# Patient Record
Sex: FEMALE | Race: WHITE | NOT HISPANIC OR LATINO | Employment: FULL TIME | URBAN - METROPOLITAN AREA
[De-identification: names, ages, dates, MRNs, and addresses within clinical notes are randomized per-mention and may not be internally consistent; named-entity substitution may affect disease eponyms.]

---

## 2023-11-14 ENCOUNTER — APPOINTMENT (OUTPATIENT)
Dept: LAB | Facility: CLINIC | Age: 33
End: 2023-11-14
Payer: COMMERCIAL

## 2023-11-14 DIAGNOSIS — Z79.899 ENCOUNTER FOR LONG-TERM (CURRENT) USE OF OTHER MEDICATIONS: ICD-10-CM

## 2023-11-14 DIAGNOSIS — M05.79 RHEUMATOID ARTHRITIS INVOLVING MULTIPLE SITES WITH POSITIVE RHEUMATOID FACTOR (HCC): ICD-10-CM

## 2023-11-14 DIAGNOSIS — E87.6 HYPOKALEMIA: ICD-10-CM

## 2023-11-14 PROCEDURE — 86480 TB TEST CELL IMMUN MEASURE: CPT

## 2023-11-15 LAB
GAMMA INTERFERON BACKGROUND BLD IA-ACNC: <0 IU/ML
M TB IFN-G BLD-IMP: NEGATIVE
M TB IFN-G CD4+ BCKGRND COR BLD-ACNC: 0 IU/ML
M TB IFN-G CD4+ BCKGRND COR BLD-ACNC: 0 IU/ML
MITOGEN IGNF BCKGRD COR BLD-ACNC: 10 IU/ML

## 2023-12-18 ENCOUNTER — TELEPHONE (OUTPATIENT)
Dept: INFUSION CENTER | Facility: CLINIC | Age: 33
End: 2023-12-18

## 2023-12-18 NOTE — TELEPHONE ENCOUNTER
Spoke directly with patient and scheduled Remicade infusion for 1/16/2024 at 12 pm at [facility]. Pt provided with phone number and address to unit. Pt works for [employer] and [facility] is most convenient for her to get to her infusion appts.

## 2024-01-16 ENCOUNTER — HOSPITAL ENCOUNTER (OUTPATIENT)
Dept: INFUSION CENTER | Facility: CLINIC | Age: 34
Discharge: HOME | End: 2024-01-16
Payer: COMMERCIAL

## 2024-01-16 PROCEDURE — 96415 CHEMO IV INFUSION ADDL HR: CPT

## 2024-01-16 PROCEDURE — 96413 CHEMO IV INFUSION 1 HR: CPT

## 2024-01-16 RX ORDER — SODIUM CHLORIDE 9 MG/ML
20 INJECTION, SOLUTION INTRAVENOUS CONTINUOUS
Status: DISCONTINUED | OUTPATIENT
Start: 2024-01-16 | End: 2024-01-19 | Stop reason: HOSPADM

## 2024-01-16 RX ADMIN — INFLIXIMAB 500 MG: 100 INJECTION, POWDER, LYOPHILIZED, FOR SOLUTION INTRAVENOUS at 12:27

## 2024-01-16 RX ADMIN — SODIUM CHLORIDE 20 ML/HR: 0.9 INJECTION, SOLUTION INTRAVENOUS at 12:28

## 2024-01-16 NOTE — PROGRESS NOTES
Patient tolerated treatment without incident. Peripheral IV removed. Next appointment to be made pending provider clarification of order frequency, six vs eight weeks. AVS offered and declined.

## 2024-01-18 ENCOUNTER — TELEPHONE (OUTPATIENT)
Age: 34
End: 2024-01-18

## 2024-01-18 NOTE — TELEPHONE ENCOUNTER
----- Message from Juanita Llanos MD sent at 1/16/2024  3:07 PM EST -----  Regarding: FW: updated orders  ----- Message -----  From: Jessica Lui  Sent: 1/16/2024   3:04 PM EST  To: Jessica Lui; #  Subject: updated orders                                   Good Afternoon,    Patient was seen today at [facility] center. Per patient and doctor note from 11/14 patient should be scheduled every 8 weeks but on orders it states every 6 weeks. Can you please clarify and send new orders.    Thank you,  Judy Lui

## 2024-01-18 NOTE — TELEPHONE ENCOUNTER
Patient has been receiving Remicade infusions every 8 weeks.  I did speak with her today to confirm that she is doing well with her current dose and frequency.  She did note that she received a call that they received an updated order for every 8-week dosing and she is scheduled for her next infusion.

## 2024-02-05 DIAGNOSIS — M05.79 SEROPOSITIVE RHEUMATOID ARTHRITIS OF MULTIPLE SITES (HCC): Primary | ICD-10-CM

## 2024-02-05 RX ORDER — SODIUM CHLORIDE 9 MG/ML
20 INJECTION, SOLUTION INTRAVENOUS ONCE
OUTPATIENT
Start: 2024-03-12

## 2024-02-05 RX ORDER — SODIUM CHLORIDE 9 MG/ML
20 INJECTION, SOLUTION INTRAVENOUS ONCE
Status: CANCELLED | OUTPATIENT
Start: 2024-02-27

## 2024-03-12 ENCOUNTER — HOSPITAL ENCOUNTER (OUTPATIENT)
Dept: INFUSION CENTER | Facility: CLINIC | Age: 34
Discharge: HOME | End: 2024-03-12
Payer: COMMERCIAL

## 2024-03-12 VITALS
RESPIRATION RATE: 18 BRPM | TEMPERATURE: 98.1 F | SYSTOLIC BLOOD PRESSURE: 107 MMHG | HEART RATE: 76 BPM | DIASTOLIC BLOOD PRESSURE: 73 MMHG | OXYGEN SATURATION: 98 %

## 2024-03-12 DIAGNOSIS — M05.79 SEROPOSITIVE RHEUMATOID ARTHRITIS OF MULTIPLE SITES (HCC): Primary | ICD-10-CM

## 2024-03-12 PROCEDURE — 96415 CHEMO IV INFUSION ADDL HR: CPT

## 2024-03-12 PROCEDURE — 96413 CHEMO IV INFUSION 1 HR: CPT

## 2024-03-12 RX ORDER — SODIUM CHLORIDE 9 MG/ML
20 INJECTION, SOLUTION INTRAVENOUS ONCE
OUTPATIENT
Start: 2024-05-07

## 2024-03-12 RX ORDER — SODIUM CHLORIDE 9 MG/ML
20 INJECTION, SOLUTION INTRAVENOUS ONCE
Status: COMPLETED | OUTPATIENT
Start: 2024-03-12 | End: 2024-03-12

## 2024-03-12 RX ADMIN — SODIUM CHLORIDE 20 ML/HR: 0.9 INJECTION, SOLUTION INTRAVENOUS at 14:17

## 2024-03-12 RX ADMIN — INFLIXIMAB 500 MG: 100 INJECTION, POWDER, LYOPHILIZED, FOR SOLUTION INTRAVENOUS at 14:46

## 2024-03-12 NOTE — PROGRESS NOTES
Patient to infusion center for Remicade, offers no complaints. Denies any recent infection/abx use. She tolerated her infusion without incident. Next appt confirmed for 5/7 at 1200, declined AVS.

## 2024-03-15 ENCOUNTER — APPOINTMENT (OUTPATIENT)
Dept: LAB | Facility: CLINIC | Age: 34
End: 2024-03-15
Payer: COMMERCIAL

## 2024-03-19 ENCOUNTER — OFFICE VISIT (OUTPATIENT)
Age: 34
End: 2024-03-19
Payer: COMMERCIAL

## 2024-03-19 VITALS
TEMPERATURE: 98.7 F | WEIGHT: 148.6 LBS | SYSTOLIC BLOOD PRESSURE: 120 MMHG | DIASTOLIC BLOOD PRESSURE: 60 MMHG | OXYGEN SATURATION: 98 % | HEIGHT: 68 IN | BODY MASS INDEX: 22.52 KG/M2 | HEART RATE: 103 BPM

## 2024-03-19 DIAGNOSIS — Z79.899 ENCOUNTER FOR LONG-TERM (CURRENT) USE OF OTHER MEDICATIONS: ICD-10-CM

## 2024-03-19 DIAGNOSIS — E55.9 VITAMIN D INSUFFICIENCY: ICD-10-CM

## 2024-03-19 DIAGNOSIS — M05.79 SEROPOSITIVE RHEUMATOID ARTHRITIS OF MULTIPLE SITES (HCC): Primary | ICD-10-CM

## 2024-03-19 DIAGNOSIS — E06.3 AUTOIMMUNE THYROIDITIS: ICD-10-CM

## 2024-03-19 PROCEDURE — 99214 OFFICE O/P EST MOD 30 MIN: CPT | Performed by: PHYSICIAN ASSISTANT

## 2024-03-19 NOTE — ASSESSMENT & PLAN NOTE
History of positive thyroid antibodies.  Thyroid function testing has been normal.  We will continue to monitor labs.

## 2024-03-19 NOTE — PROGRESS NOTES
Assessment/Plan:    Seropositive rheumatoid arthritis of multiple sites (HCC)  Her rheumatoid arthritis is well-controlled with Remicade infusions every 8 weeks.  No signs of active inflammation or synovitis on exam today.  We did discuss decreasing her dose as she has been doing well on an every 8-week dosing cycle.  We will decrease her dose to 400 mg every 8 weeks and monitor her symptoms closely.  She is thinking about conception and we did discuss that she can start Plaquenil to help control her symptoms.  As she has been doing well she would like to hold off at this time and monitor her symptoms.  Continue regular labs as ordered.  She is up-to-date with her QuantiFERON gold testing.  Follow-up in 4 months or sooner if needed.    Autoimmune thyroiditis  History of positive thyroid antibodies.  Thyroid function testing has been normal.  We will continue to monitor labs.    Vitamin D insufficiency  History of vitamin D deficiency.  Continue over-the-counter vitamin D3.  We will check vitamin D with next labs.       Diagnoses and all orders for this visit:    Seropositive rheumatoid arthritis of multiple sites (HCC)  -     CBC and differential; Standing  -     Comprehensive metabolic panel; Standing  -     Sedimentation rate, automated; Standing  -     C-reactive protein; Standing  -     Urinalysis with microscopic; Standing    Vitamin D insufficiency  -     Vitamin D 25 hydroxy; Future    Encounter for long-term (current) use of other medications  -     CBC and differential; Standing  -     Comprehensive metabolic panel; Standing  -     Sedimentation rate, automated; Standing  -     C-reactive protein; Standing  -     Urinalysis with microscopic; Standing    Autoimmune thyroiditis    Other orders  -     inFLIXimab (REMICADE) 400 mg in sodium chloride 0.9 % 210 mL IVPB        Spent 30 minutes total reviewing labs, chart notes, imaging studies, performing history and physical exam, discussing medication and  treatment, counseling patient, and completing chart note.          Subjective:      Patient ID: Lori Camacho is a 34 y.o. female.    She is here for follow-up of her seropositive rheumatoid arthritis.  She has been on Remicade since 2009.  She is currently receiving 500 mg every 8 weeks.  Her RA symptoms are quiescent and well-controlled with Remicade.  She has minimal stiffness in the morning.  She has no swelling in her joints.    She has a history of positive thyroid antibodies however her thyroid function testing has been within normal limits.  She has a history of exercise-induced asthma as well.  This has been stable.    She had labs done on 3/15/2024.  CBC, CMP essentially unremarkable.  ESR, CRP within normal limits.  She is up-to-date with her QuantiFERON gold testing and had a negative test on 11/14/2023.        The following portions of the patient's history were reviewed and updated as appropriate: allergies, current medications, past family history, past medical history, past social history, past surgical history, and problem list.    Review of Systems   Constitutional:  Negative for chills, fatigue and fever.   HENT:  Negative for hearing loss, sore throat and tinnitus.    Eyes:  Negative for pain and visual disturbance.   Respiratory:  Negative for cough and shortness of breath.    Cardiovascular:  Negative for chest pain and palpitations.   Gastrointestinal:  Negative for abdominal pain, nausea and vomiting.   Genitourinary:  Negative for difficulty urinating.   Musculoskeletal:  Negative for arthralgias, back pain, gait problem, joint swelling, myalgias, neck pain and neck stiffness.   Skin:  Negative for rash.   Neurological:  Negative for dizziness, seizures, weakness, numbness and headaches.   Psychiatric/Behavioral:  Negative for confusion, decreased concentration and sleep disturbance.          Objective:      /60 (BP Location: Left arm, Patient Position: Sitting, Cuff Size:  "Adult)   Pulse 103   Temp 98.7 °F (37.1 °C) (Tympanic)   Ht 5' 8\" (1.727 m)   Wt 67.4 kg (148 lb 9.6 oz)   SpO2 98%   BMI 22.59 kg/m²          Physical Exam  Constitutional:       Appearance: Normal appearance.   Cardiovascular:      Rate and Rhythm: Normal rate and regular rhythm.   Pulmonary:      Breath sounds: Normal breath sounds.   Musculoskeletal:      Comments: Full range of motion neck, bilateral shoulders, elbows, wrists.  No synovitis noted in hands.  Full range of motion bilateral hips, knees, ankles.  No synovitis noted in feet.   Skin:     General: Skin is warm and dry.   Neurological:      General: No focal deficit present.      Mental Status: She is alert.         Dragon Dictation software was used to dictate this note. It may contain errors with dictating incorrect words/spelling. Please contact provider directly for any questions.    "

## 2024-03-19 NOTE — ASSESSMENT & PLAN NOTE
Her rheumatoid arthritis is well-controlled with Remicade infusions every 8 weeks.  No signs of active inflammation or synovitis on exam today.  We did discuss decreasing her dose as she has been doing well on an every 8-week dosing cycle.  We will decrease her dose to 400 mg every 8 weeks and monitor her symptoms closely.  She is thinking about conception and we did discuss that she can start Plaquenil to help control her symptoms.  As she has been doing well she would like to hold off at this time and monitor her symptoms.  Continue regular labs as ordered.  She is up-to-date with her QuantiFERON gold testing.  Follow-up in 4 months or sooner if needed.

## 2024-03-19 NOTE — ASSESSMENT & PLAN NOTE
History of vitamin D deficiency.  Continue over-the-counter vitamin D3.  We will check vitamin D with next labs.

## 2024-05-13 ENCOUNTER — APPOINTMENT (OUTPATIENT)
Dept: LAB | Facility: CLINIC | Age: 34
End: 2024-05-13
Payer: COMMERCIAL

## 2024-05-13 ENCOUNTER — NURSE TRIAGE (OUTPATIENT)
Age: 34
End: 2024-05-13

## 2024-05-13 DIAGNOSIS — O20.9 BLEEDING IN EARLY PREGNANCY: ICD-10-CM

## 2024-05-13 DIAGNOSIS — O20.9 BLEEDING IN EARLY PREGNANCY: Primary | ICD-10-CM

## 2024-05-13 LAB
ABO GROUP BLD: NORMAL
B-HCG SERPL-ACNC: 1061.2 MIU/ML (ref 0–5)
BLD GP AB SCN SERPL QL: NEGATIVE
RH BLD: POSITIVE
SPECIMEN EXPIRATION DATE: NORMAL

## 2024-05-13 PROCEDURE — 36415 COLL VENOUS BLD VENIPUNCTURE: CPT

## 2024-05-13 PROCEDURE — 86900 BLOOD TYPING SEROLOGIC ABO: CPT

## 2024-05-13 PROCEDURE — 86901 BLOOD TYPING SEROLOGIC RH(D): CPT

## 2024-05-13 PROCEDURE — 86850 RBC ANTIBODY SCREEN: CPT

## 2024-05-13 PROCEDURE — 84702 CHORIONIC GONADOTROPIN TEST: CPT

## 2024-05-13 NOTE — TELEPHONE ENCOUNTER
Placed call to the patient. She is still having bleeding- reviewed with her to go to the ER if she is soaking a pad in 1.5hr or less, or if she has severe abd pain. Advised patient no heavy lifting, pelvic rest, and ordered quants x2 with blood type and screen (unsure of blood type). Patient aware that we will be following up upon her lab results / schedule ultrasound if needed. Patient is agreeable to the plan of care.

## 2024-05-13 NOTE — TELEPHONE ENCOUNTER
Please call patient regarding bleeding. HCG quants can be ordered. Will need to do a 48 hour trend or bring her in for a viability US.

## 2024-05-13 NOTE — TELEPHONE ENCOUNTER
"Patient called states LMP 3/30. States she started experiencing vaginal bleeding/spotting on Saturday which started as light pink when wiping and progressed to bright red bleeding on Sunday. Patient denies heavy vaginal bleeding, passing any large clots, or severe abdominal pain. States she is bleeding less than 1 pad an hour and denies any current abdominal pain. D/V scheduled 5/24. Denies any recent intercourse, exercise, feeling lightheaded or dizzy. Advised will reach out to provider on call to make aware. Care advice reviewed. Patient aware will receive a call back with next steps. Patient verbalized understanding. No further questions at this time.     Reason for Disposition   MILD vaginal bleeding (i.e., less than 1 pad per  hour; less than patient's usual menstrual bleeding; not just spotting)    Answer Assessment - Initial Assessment Questions  1. ONSET: \"When did this bleeding start?\"        Saturday evening - light pink when wiping. Mid afternoon Sunday bright red and heavier   2. DESCRIPTION: \"Describe the bleeding that you are having.\" \"How much bleeding is there?\"     - SPOTTING: spotting, or pinkish / brownish mucous discharge; does not fill panti-liner or pad     - MILD:  less than 1 pad / hour; less than patient's usual menstrual bleeding    - MODERATE: 1-2 pads / hour; 1 menstrual cup every 6 hours; small-medium blood clots (e.g., pea, grape, small coin)    - SEVERE: soaking 2 or more pads/hour for 2 or more hours; 1 menstrual cup every 2 hours; bleeding not contained by pads or continuous red blood from vagina; large blood clots (e.g., golf ball, large coin)       Less than a pad an hour - bright red. Difficult to assess size of clot this morning  3. ABDOMINAL PAIN SEVERITY: If present, ask: \"How bad is it?\"  (e.g., Scale 1-10; mild, moderate, or severe)    - MILD (1-3): doesn't interfere with normal activities, abdomen soft and not tender to touch     - MODERATE (4-7): interferes with normal " "activities or awakens from sleep, tender to touch     - SEVERE (8-10): excruciating pain, doubled over, unable to do any normal activities      Yesterday 2-3/10. Denies any pain today  4. PREGNANCY: \"Do you know how many weeks or months pregnant you are?\" \"When was the first day of your last normal menstrual period?\"      Lmp 3/30  5. HEMODYNAMIC STATUS: \"Are you weak or feeling lightheaded?\" If Yes, ask: \"Can you stand and walk normally?\"       denies  6. OTHER SYMPTOMS: \"What other symptoms are you having with the bleeding?\" (e.g., passed tissue, vaginal discharge, fever, menstrual-type cramps)      Clot this morning    Protocols used: Pregnancy - Vaginal Bleeding Less Than 20 Weeks EGA-ADULT-OH    "

## 2024-05-15 ENCOUNTER — APPOINTMENT (OUTPATIENT)
Dept: LAB | Facility: CLINIC | Age: 34
End: 2024-05-15
Payer: COMMERCIAL

## 2024-05-15 DIAGNOSIS — O20.9 BLEEDING IN EARLY PREGNANCY: ICD-10-CM

## 2024-05-15 LAB — B-HCG SERPL-ACNC: 995.3 MIU/ML (ref 0–5)

## 2024-05-15 PROCEDURE — 36415 COLL VENOUS BLD VENIPUNCTURE: CPT

## 2024-05-15 PROCEDURE — 84702 CHORIONIC GONADOTROPIN TEST: CPT

## 2024-05-16 DIAGNOSIS — O03.9 SPONTANEOUS ABORTION: Primary | ICD-10-CM

## 2024-05-24 ENCOUNTER — TELEPHONE (OUTPATIENT)
Dept: OBGYN CLINIC | Facility: CLINIC | Age: 34
End: 2024-05-24

## 2024-05-24 ENCOUNTER — APPOINTMENT (OUTPATIENT)
Dept: LAB | Facility: CLINIC | Age: 34
End: 2024-05-24
Payer: COMMERCIAL

## 2024-05-24 DIAGNOSIS — Z00.8 OTHER SPECIFIED GENERAL MEDICAL EXAMINATION: ICD-10-CM

## 2024-05-24 DIAGNOSIS — O03.9 SPONTANEOUS ABORTION: ICD-10-CM

## 2024-05-24 LAB
B-HCG SERPL-ACNC: 22.6 MIU/ML (ref 0–5)
CHOLEST SERPL-MCNC: 149 MG/DL
EST. AVERAGE GLUCOSE BLD GHB EST-MCNC: 105 MG/DL
HBA1C MFR BLD: 5.3 %
HDLC SERPL-MCNC: 65 MG/DL
LDLC SERPL CALC-MCNC: 77 MG/DL (ref 0–100)
NONHDLC SERPL-MCNC: 84 MG/DL
TRIGL SERPL-MCNC: 36 MG/DL

## 2024-05-24 PROCEDURE — 36415 COLL VENOUS BLD VENIPUNCTURE: CPT

## 2024-05-24 PROCEDURE — 84702 CHORIONIC GONADOTROPIN TEST: CPT

## 2024-05-24 PROCEDURE — 80061 LIPID PANEL: CPT

## 2024-05-24 PROCEDURE — 83036 HEMOGLOBIN GLYCOSYLATED A1C: CPT

## 2024-05-31 ENCOUNTER — APPOINTMENT (OUTPATIENT)
Dept: LAB | Facility: CLINIC | Age: 34
End: 2024-05-31
Payer: COMMERCIAL

## 2024-05-31 DIAGNOSIS — O03.9 SPONTANEOUS ABORTION: ICD-10-CM

## 2024-05-31 DIAGNOSIS — E55.9 VITAMIN D INSUFFICIENCY: ICD-10-CM

## 2024-05-31 LAB — B-HCG SERPL-ACNC: 4 MIU/ML (ref 0–5)

## 2024-05-31 PROCEDURE — 84702 CHORIONIC GONADOTROPIN TEST: CPT

## 2024-05-31 PROCEDURE — 36415 COLL VENOUS BLD VENIPUNCTURE: CPT

## 2024-06-03 ENCOUNTER — OFFICE VISIT (OUTPATIENT)
Dept: URGENT CARE | Facility: CLINIC | Age: 34
End: 2024-06-03
Payer: COMMERCIAL

## 2024-06-03 VITALS
RESPIRATION RATE: 18 BRPM | HEIGHT: 67 IN | SYSTOLIC BLOOD PRESSURE: 112 MMHG | DIASTOLIC BLOOD PRESSURE: 70 MMHG | OXYGEN SATURATION: 99 % | BODY MASS INDEX: 21.97 KG/M2 | WEIGHT: 140 LBS | TEMPERATURE: 98.1 F | HEART RATE: 84 BPM

## 2024-06-03 DIAGNOSIS — R42 DIZZINESS AND GIDDINESS: Primary | ICD-10-CM

## 2024-06-03 DIAGNOSIS — H61.23 BILATERAL IMPACTED CERUMEN: ICD-10-CM

## 2024-06-03 PROCEDURE — 99213 OFFICE O/P EST LOW 20 MIN: CPT

## 2024-06-03 RX ORDER — MECLIZINE HYDROCHLORIDE 25 MG/1
25 TABLET ORAL 3 TIMES DAILY PRN
Qty: 30 TABLET | Refills: 0 | Status: SHIPPED | OUTPATIENT
Start: 2024-06-03

## 2024-06-03 NOTE — PROGRESS NOTES
[facility]        NAME: Lori Camacho is a 34 y.o. female  : 1990    MRN: [id number]  DATE: Mara 3, 2024  TIME: 11:28 AM    Assessment and Plan   Dizziness and giddiness [R42]  1. Dizziness and giddiness  meclizine (ANTIVERT) 25 mg tablet      2. Bilateral impacted cerumen              Patient Instructions     Start meclizine and take as needed as directed.    Drink plenty of fluids.    Follow-up with your PCP in 3-5 days.    Go to the ED for any persistent or worsening symptoms.    Use over-the-counter Debrox drops in the following manner: 5 drops in each ear daily for 5 consecutive days every month.     If tests are performed, our office will contact you with results only if changes need to made to the care plan discussed with you at the visit. You can review your full results on [facility].      Chief Complaint     Chief Complaint   Patient presents with    Dizziness     Pt reports on Saturday night she woke up and vomited - reports dizziness at that time. Reports yesterday vomiting with sitting and standing. Today reports continued dizziness with head movement.          History of Present Illness       34-year-old female who presents with dizziness x 1 day. Patient states she woke up from sleep early  morning and felt nauseous. Stood up out of bed quickly, felt dizzy, and vomited x 1. States she had a few more episodes of vomiting throughout the day yesterday with position changes. Felt best while lying on her left side. Has not vomited yet today. Yesterday her coworker in PT came over and performed the Epley maneuver twice which has helped her symptoms improve. Does still have some lingering dizziness and lightheadedness today. No known fevers/chills. Denies headache, weakness, blurred vision, chest pain, shortness of breath, abdominal pain, and back/neck pain. Denies history of migraines. Had similar experience with ruptured ear drum 2023.        Review of Systems    Review of Systems   Constitutional:  Negative for chills and fever.   HENT:  Negative for congestion, ear pain, sinus pressure and sore throat.    Eyes:  Negative for photophobia, pain and visual disturbance.   Respiratory:  Negative for chest tightness and shortness of breath.    Cardiovascular:  Negative for chest pain and palpitations.   Gastrointestinal:  Positive for nausea and vomiting (resolved). Negative for abdominal pain and diarrhea.   Musculoskeletal:  Negative for back pain and neck pain.   Skin:  Negative for pallor and rash.   Neurological:  Positive for dizziness and light-headedness. Negative for syncope, weakness, numbness and headaches.         Current Medications       Current Outpatient Medications:     CALCIUM PO, Take 2,000 mg by mouth daily, Disp: , Rfl:     Cholecalciferol (Vitamin D) 125 MCG (5000 UT) CAPS, Take by mouth daily , Disp: , Rfl:     meclizine (ANTIVERT) 25 mg tablet, Take 1 tablet (25 mg total) by mouth 3 (three) times a day as needed for dizziness, Disp: 30 tablet, Rfl: 0    inFLIXimab (REMICADE) 100 mg, Infuse into a venous catheter (Patient not taking: Reported on 6/3/2024), Disp: , Rfl:     medroxyPROGESTERone (DEPO-PROVERA) 150 mg/mL injection, Inject 1 mL (150 mg total) into a muscle every 3 (three) months (Patient not taking: Reported on 1/16/2024), Disp: 1 mL, Rfl: 3    medroxyPROGESTERone acetate (DEPO-PROVERA SYRINGE) 150 mg/mL injection, , Disp: , Rfl:     Current Facility-Administered Medications:     medroxyPROGESTERone acetate (DEPO-PROVERA SYRINGE) IM injection 150 mg, 150 mg, Intramuscular, Q3 Months, Lena Vital PA-C, 150 mg at 03/21/23 1250    ondansetron (ZOFRAN-ODT) dispersible tablet 4 mg, 4 mg, Oral, Q6H PRN, CHARY Abreu, 4 mg at 01/06/23 1254    Current Allergies     Allergies as of 06/03/2024 - Reviewed 06/03/2024   Allergen Reaction Noted    Amoxicillin Other (See Comments) 04/25/2017    Other Other (See Comments) 07/05/2018         "    The following portions of the patient's history were reviewed and updated as appropriate: allergies, current medications, past family history, past medical history, past social history, past surgical history and problem list.     Past Medical History:   Diagnosis Date    Abnormal Pap smear of cervix     Asthma     exercise induced    Exercise-induced asthma     Hashimoto's thyroiditis     RA (rheumatoid arthritis) (HCC)     Seasonal allergies        Past Surgical History:   Procedure Laterality Date    CERVICAL BIOPSY  W/ LOOP ELECTRODE EXCISION  Cryo surgery    DENTAL SURGERY         Family History   Problem Relation Age of Onset    No Known Problems Mother     No Known Problems Father     No Known Problems Maternal Grandmother     Hypothyroidism Family     Cancer Family     Hypertension Family          Medications have been verified.        Objective   /70   Pulse 84   Temp 98.1 °F (36.7 °C)   Resp 18   Ht 5' 7\" (1.702 m)   Wt 63.5 kg (140 lb)   SpO2 99%   BMI 21.93 kg/m²        Physical Exam     Physical Exam  Vitals and nursing note reviewed.   Constitutional:       General: She is not in acute distress.     Appearance: She is not ill-appearing, toxic-appearing or diaphoretic.   HENT:      Head: Normocephalic and atraumatic.      Right Ear: There is impacted cerumen.      Left Ear: There is impacted cerumen.      Nose: Nose normal.      Mouth/Throat:      Mouth: Mucous membranes are moist.      Pharynx: Oropharynx is clear.   Eyes:      Extraocular Movements: Extraocular movements intact.      Conjunctiva/sclera: Conjunctivae normal.      Pupils: Pupils are equal, round, and reactive to light.   Cardiovascular:      Rate and Rhythm: Normal rate and regular rhythm.      Pulses: Normal pulses.      Heart sounds: Normal heart sounds.   Pulmonary:      Effort: Pulmonary effort is normal.      Breath sounds: Normal breath sounds.   Musculoskeletal:         General: Normal range of motion.      " Cervical back: Normal range of motion and neck supple.   Skin:     General: Skin is warm and dry.      Capillary Refill: Capillary refill takes less than 2 seconds.   Neurological:      Mental Status: She is alert and oriented to person, place, and time.      GCS: GCS eye subscore is 4. GCS verbal subscore is 5. GCS motor subscore is 6.      Sensory: Sensation is intact.      Motor: Motor function is intact. No weakness.      Coordination: Coordination is intact. Romberg sign negative. Finger-Nose-Finger Test normal.      Gait: Gait normal.

## 2024-06-03 NOTE — PATIENT INSTRUCTIONS
Start meclizine and take as needed as directed.    Drink plenty of fluids.    Follow-up with your PCP in 3-5 days.    Go to the ED for any persistent or worsening symptoms.    Use over-the-counter Debrox drops in the following manner: 5 drops in each ear daily for 5 consecutive days every month.

## 2024-06-03 NOTE — LETTER
Mara 3, 2024     Patient: Lori Camacho   YOB: 1990   Date of Visit: 6/3/2024       To Whom it May Concern:    Lori Camacho was seen in my clinic on 6/3/2024. She may return to work on 6/5/2024 .    If you have any questions or concerns, please don't hesitate to call.         Sincerely,          CHARY Honeycutt        CC: No Recipients

## 2024-06-10 ENCOUNTER — TELEPHONE (OUTPATIENT)
Age: 34
End: 2024-06-10

## 2024-06-10 NOTE — TELEPHONE ENCOUNTER
Pt calls in and states that the last time she spoke with Lori she informed her that she was pregnant and would be stopping Remicade infusions. Unfortunately she had a miscarriage  and wanted Lori's advise on continuing Remicade

## 2024-06-11 NOTE — TELEPHONE ENCOUNTER
I spoke with Lori today.  Her OB/GYN has recommended waiting until she has a full menstrual cycle before trying to conceive again.  As this has not happened yet I did suggest that she could consider getting another Remicade infusion now as her last infusion was in March.  We will continue to monitor closely and when she gets pregnant we will plan to discontinue infusions.

## 2024-06-20 DIAGNOSIS — M05.79 SEROPOSITIVE RHEUMATOID ARTHRITIS OF MULTIPLE SITES (HCC): Primary | ICD-10-CM

## 2024-06-20 RX ORDER — SODIUM CHLORIDE 9 MG/ML
20 INJECTION, SOLUTION INTRAVENOUS ONCE
Status: CANCELLED | OUTPATIENT
Start: 2024-06-25

## 2024-06-25 ENCOUNTER — HOSPITAL ENCOUNTER (OUTPATIENT)
Dept: INFUSION CENTER | Facility: CLINIC | Age: 34
Discharge: HOME | End: 2024-06-25
Payer: COMMERCIAL

## 2024-06-25 VITALS
RESPIRATION RATE: 16 BRPM | TEMPERATURE: 98.4 F | DIASTOLIC BLOOD PRESSURE: 66 MMHG | HEART RATE: 91 BPM | OXYGEN SATURATION: 99 % | SYSTOLIC BLOOD PRESSURE: 100 MMHG

## 2024-06-25 DIAGNOSIS — M05.79 SEROPOSITIVE RHEUMATOID ARTHRITIS OF MULTIPLE SITES (HCC): Primary | ICD-10-CM

## 2024-06-25 RX ORDER — SODIUM CHLORIDE 9 MG/ML
20 INJECTION, SOLUTION INTRAVENOUS ONCE
Status: COMPLETED | OUTPATIENT
Start: 2024-06-25 | End: 2024-06-25

## 2024-06-25 RX ORDER — SODIUM CHLORIDE 9 MG/ML
20 INJECTION, SOLUTION INTRAVENOUS ONCE
OUTPATIENT
Start: 2024-08-20

## 2024-06-25 RX ADMIN — INFLIXIMAB 400 MG: 100 INJECTION, POWDER, LYOPHILIZED, FOR SOLUTION INTRAVENOUS at 13:58

## 2024-06-25 RX ADMIN — SODIUM CHLORIDE 20 ML/HR: 0.9 INJECTION, SOLUTION INTRAVENOUS at 13:32

## 2024-06-25 NOTE — PROGRESS NOTES
Pt tolerated treatment without incident. COnfirmed next apt for 8/20/24 @ 12pm @ [facility]. Mally ADLERS.

## 2024-07-29 ENCOUNTER — TELEPHONE (OUTPATIENT)
Age: 34
End: 2024-07-29

## 2024-07-29 NOTE — TELEPHONE ENCOUNTER
Left message for patient to return office call in regards to scheduling an appointment or verifying if she is still a patient at our office     Thank you

## 2024-08-20 ENCOUNTER — HOSPITAL ENCOUNTER (OUTPATIENT)
Dept: INFUSION CENTER | Facility: CLINIC | Age: 34
Discharge: HOME | End: 2024-08-20
Payer: COMMERCIAL

## 2024-08-20 VITALS
TEMPERATURE: 99.8 F | HEART RATE: 87 BPM | RESPIRATION RATE: 18 BRPM | SYSTOLIC BLOOD PRESSURE: 100 MMHG | OXYGEN SATURATION: 99 % | DIASTOLIC BLOOD PRESSURE: 67 MMHG

## 2024-08-20 DIAGNOSIS — M05.79 SEROPOSITIVE RHEUMATOID ARTHRITIS OF MULTIPLE SITES (HCC): Primary | ICD-10-CM

## 2024-08-20 PROCEDURE — 96415 CHEMO IV INFUSION ADDL HR: CPT

## 2024-08-20 PROCEDURE — 96413 CHEMO IV INFUSION 1 HR: CPT

## 2024-08-20 RX ORDER — SODIUM CHLORIDE 9 MG/ML
20 INJECTION, SOLUTION INTRAVENOUS ONCE
Status: COMPLETED | OUTPATIENT
Start: 2024-08-20 | End: 2024-08-20

## 2024-08-20 RX ORDER — SODIUM CHLORIDE 9 MG/ML
20 INJECTION, SOLUTION INTRAVENOUS ONCE
OUTPATIENT
Start: 2024-10-15

## 2024-08-20 RX ADMIN — SODIUM CHLORIDE 20 ML/HR: 0.9 INJECTION, SOLUTION INTRAVENOUS at 12:48

## 2024-08-20 RX ADMIN — INFLIXIMAB 400 MG: 100 INJECTION, POWDER, LYOPHILIZED, FOR SOLUTION INTRAVENOUS at 12:48

## 2024-08-20 NOTE — PROGRESS NOTES
Patient presents today for treatment with remicade. Patient offers no complaints. VSS. Patient denies recent illness/infection and or antibiotic use. Peripheral IV inserted without incident.

## 2024-08-20 NOTE — PROGRESS NOTES
Patient tolerated treatment without incident. Peripheral IV removed. Next appointment confirmed for 10/15/2024 at 1400. AVS offered and declined.

## 2024-08-27 ENCOUNTER — ANNUAL EXAM (OUTPATIENT)
Dept: OBGYN CLINIC | Facility: CLINIC | Age: 34
End: 2024-08-27
Payer: COMMERCIAL

## 2024-08-27 VITALS
WEIGHT: 141 LBS | SYSTOLIC BLOOD PRESSURE: 116 MMHG | HEIGHT: 67 IN | DIASTOLIC BLOOD PRESSURE: 64 MMHG | BODY MASS INDEX: 22.13 KG/M2

## 2024-08-27 DIAGNOSIS — Z12.4 ENCOUNTER FOR SCREENING FOR MALIGNANT NEOPLASM OF CERVIX: ICD-10-CM

## 2024-08-27 DIAGNOSIS — R87.810 ASCUS WITH POSITIVE HIGH RISK HPV CERVICAL: ICD-10-CM

## 2024-08-27 DIAGNOSIS — R87.610 ASCUS WITH POSITIVE HIGH RISK HPV CERVICAL: ICD-10-CM

## 2024-08-27 DIAGNOSIS — Z31.9 DESIRE FOR PREGNANCY: ICD-10-CM

## 2024-08-27 DIAGNOSIS — Z01.419 WELL WOMAN EXAM WITH ROUTINE GYNECOLOGICAL EXAM: Primary | ICD-10-CM

## 2024-08-27 DIAGNOSIS — Z11.51 SCREENING FOR HPV (HUMAN PAPILLOMAVIRUS): ICD-10-CM

## 2024-08-27 PROCEDURE — G0145 SCR C/V CYTO,THINLAYER,RESCR: HCPCS | Performed by: PATHOLOGY

## 2024-08-27 PROCEDURE — G0476 HPV COMBO ASSAY CA SCREEN: HCPCS | Performed by: PHYSICIAN ASSISTANT

## 2024-08-27 PROCEDURE — G0124 SCREEN C/V THIN LAYER BY MD: HCPCS | Performed by: PATHOLOGY

## 2024-08-27 PROCEDURE — S0612 ANNUAL GYNECOLOGICAL EXAMINA: HCPCS | Performed by: PHYSICIAN ASSISTANT

## 2024-08-27 NOTE — PROGRESS NOTES
ASSESSMENT & PLAN:   Diagnoses and all orders for this visit:    Well woman exam with routine gynecological exam  -     Liquid-based pap, screening    Screening for HPV (human papillomavirus)  -     Liquid-based pap, screening    Encounter for screening for malignant neoplasm of cervix  -     Liquid-based pap, screening    ASCUS with positive high risk HPV cervical  -     Liquid-based pap, screening    Desire for pregnancy  -     Ambulatory Referral to Infertility; Future      Actively trying to conceive. Had MAB in 2024. Open to seeing SHAYLEE    The following were reviewed in today's visit: ASCCP guidelines, Gardisil vaccination, STD testing breast self exam, STD testing, exercise, and healthy diet.    Patient to return to office in yearly for annual exam.     All questions have been answered to her satisfaction.        CC:  Annual Gynecologic Examination  Chief Complaint   Patient presents with    Gynecologic Exam     Lori Camacho is here for her annual appt; pap ordered per note.  (hx cryo, ANDREA 3, PAP , NILM, HPV neg. yearly paps  Last pap 2022 ASCUS/ Pos HPV other )         HPI: Lori Camacho is a 34 y.o.  who presents for annual gynecologic examination.  She has the following concerns:    Desire for conception      Health Maintenance:    Exercise: frequently  Breast exams/breast awareness: yes    Past Medical History:   Diagnosis Date    Abnormal Pap smear of cervix     Asthma     exercise induced    Exercise-induced asthma     Hashimoto's thyroiditis     RA (rheumatoid arthritis) (HCC)     Seasonal allergies        Past Surgical History:   Procedure Laterality Date    CERVICAL BIOPSY  W/ LOOP ELECTRODE EXCISION  Cryo surgery    DENTAL SURGERY         Past OB/Gyn History:  Period Cycle (Days): 27  Period Duration (Days): 7  Period Pattern: Regular  Menstrual Flow: Moderate  Menstrual Control: Panty liner, Thin pad  Dysmenorrhea: (!) Mild  Dysmenorrhea Symptoms: CrampingPatient's  last menstrual period was 08/05/2024 (exact date).    Last Pap: 2023 : ASCUS with NEGATIVE high risk HPV  History of abnormal Pap smear: yes  HPV vaccine completed: yes    Patient is currently sexually active.   STD testing: no  Current contraception: none      Family History  Family History   Problem Relation Age of Onset    No Known Problems Mother     No Known Problems Father     No Known Problems Maternal Grandmother     Hypothyroidism Family     Cancer Family     Hypertension Family        Family history of uterine or ovarian cancer: no  Family history of breast cancer: no  Family history of colon cancer: no    Social History:  Social History     Socioeconomic History    Marital status: /Civil Union     Spouse name: Not on file    Number of children: Not on file    Years of education: graduated from [employer]    Highest education level: Not on file   Occupational History    Occupation: [employer]   Tobacco Use    Smoking status: Never    Smokeless tobacco: Never   Vaping Use    Vaping status: Never Used   Substance and Sexual Activity    Alcohol use: Yes     Alcohol/week: 1.0 standard drink of alcohol     Types: 1 Glasses of wine per week     Comment: occasional    Drug use: No    Sexual activity: Yes     Partners: Male     Birth control/protection: None   Other Topics Concern    Not on file   Social History Narrative    Daily coffee consumption    Exercises regularly     Social Determinants of Health     Financial Resource Strain: Not on file   Food Insecurity: Not on file   Transportation Needs: Not on file   Physical Activity: Not on file   Stress: Not on file   Social Connections: Unknown (6/18/2024)    Received from [facility]     How often do you feel lonely or isolated from those around you? (Adult - for ages 18 years and over): Not on file   Intimate Partner Violence: Not on file   Housing Stability: Not on file     Domestic violence screen:  "negative    Allergies:  Allergies   Allergen Reactions    Amoxicillin Other (See Comments)     Childhood allergy     Other Other (See Comments)     Seasonal- Runny nose/ congestion        Medications:    Current Outpatient Medications:     CALCIUM PO, Take 2,000 mg by mouth daily, Disp: , Rfl:     Cholecalciferol (Vitamin D) 125 MCG (5000 UT) CAPS, Take by mouth daily , Disp: , Rfl:     inFLIXimab (REMICADE) 100 mg, Inject into a catheter in a vein, Disp: , Rfl:     Current Facility-Administered Medications:     medroxyPROGESTERone acetate (DEPO-PROVERA SYRINGE) IM injection 150 mg, 150 mg, Intramuscular, Q3 Months, Lena Vital PA-C, 150 mg at 03/21/23 1250    ondansetron (ZOFRAN-ODT) dispersible tablet 4 mg, 4 mg, Oral, Q6H PRN, CHARY Abreu, 4 mg at 01/06/23 1254    Review of Systems:  Review of Systems   Constitutional:  Negative for chills, fever and unexpected weight change.   Respiratory:  Negative for shortness of breath.    Cardiovascular:  Negative for chest pain.   Gastrointestinal:  Negative for abdominal pain, diarrhea, nausea and vomiting.   Skin:  Negative for rash.   Psychiatric/Behavioral:  Negative for dysphoric mood. The patient is not nervous/anxious.          Physical Exam:  /64 (BP Location: Right arm, Patient Position: Sitting, Cuff Size: Standard)   Ht 5' 7\" (1.702 m)   Wt 64 kg (141 lb)   LMP 08/05/2024 (Exact Date)   BMI 22.08 kg/m²    Physical Exam  Constitutional:       Appearance: Normal appearance.   Genitourinary:      Vulva and urethral meatus normal.      No lesions in the vagina.      Right Labia: No rash or lesions.     Left Labia: No lesions or rash.     No vaginal discharge, erythema or bleeding.        Right Adnexa: not tender and no mass present.     Left Adnexa: not tender and no mass present.     No cervical discharge or lesion.      Uterus is not tender.   Breasts:     Breasts are symmetrical.      Right: No mass or skin change.      Left: No mass or " skin change.   HENT:      Head: Normocephalic and atraumatic.   Cardiovascular:      Rate and Rhythm: Normal rate and regular rhythm.      Heart sounds: Normal heart sounds. No murmur heard.     No friction rub. No gallop.   Pulmonary:      Effort: Pulmonary effort is normal.      Breath sounds: Normal breath sounds. No wheezing, rhonchi or rales.   Abdominal:      General: Abdomen is flat. There is no distension.      Palpations: Abdomen is soft.      Tenderness: There is no abdominal tenderness.   Musculoskeletal:      Cervical back: Neck supple.   Lymphadenopathy:      Upper Body:      Right upper body: No axillary adenopathy.      Left upper body: No axillary adenopathy.   Neurological:      General: No focal deficit present.      Mental Status: She is alert.   Skin:     General: Skin is warm and dry.   Psychiatric:         Mood and Affect: Mood normal.         Behavior: Behavior normal.   Vitals reviewed.

## 2024-08-28 LAB
HPV HR 12 DNA CVX QL NAA+PROBE: NEGATIVE
HPV16 DNA CVX QL NAA+PROBE: NEGATIVE
HPV18 DNA CVX QL NAA+PROBE: NEGATIVE

## 2024-09-04 LAB
LAB AP GYN PRIMARY INTERPRETATION: ABNORMAL
Lab: ABNORMAL
PATH INTERP SPEC-IMP: ABNORMAL

## 2024-09-30 ENCOUNTER — APPOINTMENT (OUTPATIENT)
Dept: LAB | Facility: CLINIC | Age: 34
End: 2024-09-30
Payer: COMMERCIAL

## 2024-09-30 DIAGNOSIS — Z32.01 POSITIVE URINE PREGNANCY TEST: ICD-10-CM

## 2024-09-30 DIAGNOSIS — Z32.01 POSITIVE URINE PREGNANCY TEST: Primary | ICD-10-CM

## 2024-09-30 LAB — B-HCG SERPL-ACNC: ABNORMAL MIU/ML (ref 0–5)

## 2024-09-30 PROCEDURE — 36415 COLL VENOUS BLD VENIPUNCTURE: CPT

## 2024-09-30 PROCEDURE — 84702 CHORIONIC GONADOTROPIN TEST: CPT

## 2024-10-02 ENCOUNTER — APPOINTMENT (OUTPATIENT)
Dept: LAB | Facility: CLINIC | Age: 34
End: 2024-10-02
Payer: COMMERCIAL

## 2024-10-02 DIAGNOSIS — Z32.01 POSITIVE URINE PREGNANCY TEST: ICD-10-CM

## 2024-10-02 LAB — B-HCG SERPL-ACNC: ABNORMAL MIU/ML (ref 0–5)

## 2024-10-02 PROCEDURE — 84702 CHORIONIC GONADOTROPIN TEST: CPT

## 2024-10-02 PROCEDURE — 36415 COLL VENOUS BLD VENIPUNCTURE: CPT

## 2024-10-03 ENCOUNTER — TELEPHONE (OUTPATIENT)
Age: 34
End: 2024-10-03

## 2024-10-03 ENCOUNTER — ULTRASOUND (OUTPATIENT)
Dept: OBGYN CLINIC | Facility: CLINIC | Age: 34
End: 2024-10-03
Payer: COMMERCIAL

## 2024-10-03 VITALS
SYSTOLIC BLOOD PRESSURE: 120 MMHG | WEIGHT: 136 LBS | HEIGHT: 67 IN | DIASTOLIC BLOOD PRESSURE: 82 MMHG | BODY MASS INDEX: 21.35 KG/M2

## 2024-10-03 DIAGNOSIS — N91.2 AMENORRHEA: Primary | ICD-10-CM

## 2024-10-03 PROCEDURE — 99214 OFFICE O/P EST MOD 30 MIN: CPT | Performed by: PHYSICIAN ASSISTANT

## 2024-10-03 PROCEDURE — 76817 TRANSVAGINAL US OBSTETRIC: CPT | Performed by: PHYSICIAN ASSISTANT

## 2024-10-03 NOTE — TELEPHONE ENCOUNTER
Please remove PCP from pt chart     Pt transferred to another facility due to distance     Please see pt message from 9/12/2024    Thank you

## 2024-10-03 NOTE — PROGRESS NOTES
"Assessment/Plan:  - TVUS reveals IUP consistent with LMP dating.   - Reviewed bleeding precautions  - Call for concerns  - RTO 3 weeks for viability scan  Encounter Diagnosis     ICD-10-CM    1. Amenorrhea  N91.2               Subjective:       Patient ID: Lori Camacho 1990        Lori Camacho is a 34 y.o.  presenting to the office for pregnancy confirmation. Patient's last menstrual period was 2024 (exact date). , placing her at 4w6d today with MIN of 6-6-25. She is feeling well today. Patient had HCG done which was ~10,000 and then ~20,000 48 hours later.         OB History    Para Term  AB Living   2 0 0 0 1 0   SAB IAB Ectopic Multiple Live Births   1 0 0 0 0      # Outcome Date GA Lbr Michael/2nd Weight Sex Type Anes PTL Lv   2 Current            1 SAB 24 6w0d    SAB   FD      Obstetric Comments   Menarche 12         The following portions of the patient's history were reviewed and updated as appropriate: allergies, current medications, past family history, past medical history, past social history, past surgical history, and problem list.    Allergies:  Amoxicillin and Other    Medications:    Current Outpatient Medications:     CALCIUM PO, Take 2,000 mg by mouth daily, Disp: , Rfl:     Cholecalciferol (Vitamin D) 125 MCG (5000 UT) CAPS, Take by mouth daily , Disp: , Rfl:     inFLIXimab (REMICADE) 100 mg, Inject into a catheter in a vein, Disp: , Rfl:     Current Facility-Administered Medications:     ondansetron (ZOFRAN-ODT) dispersible tablet 4 mg, 4 mg, Oral, Q6H PRN, CHARY Abreu, 4 mg at 23 1254      Review of Systems:   Review of Systems       Objective:       Visit Vitals  /82 (BP Location: Left arm, Patient Position: Sitting, Cuff Size: Standard)   Ht 5' 7\" (1.702 m)   Wt 61.7 kg (136 lb)   LMP 2024 (Exact Date)   BMI 21.30 kg/m²   OB Status Pregnant   Smoking Status Never   BSA 1.72 m²        GEN: The patient was alert and " oriented x3, pleasant well-appearing female in no acute distress.   PULM: nonlabored respirations  MSK: Normal gait  : WNl  Skin: warm, dry  Neuro: no focal deficits  Psych: normal affect and judgement, cooperative    Ultrasound:     Viability US     Gestational sac: present               Location: intrauterine    Size: 1.37cm (5w4d)  Yolk sac: present  Fetal pole: not visualized     Ovaries: normal appearing bilaterally  Cul de sac: absence of free fluid  Uterus: normal in appearance           Ultrasound Probe Disinfection    A transvaginal ultrasound was performed.   Prior to use, disinfection was performed with High Level Disinfection Process (Trophon)  Probe serial number RVRSDE: [id number] was used    Lynsey Chandra PA-C  10/03/24  1:21 PM

## 2024-10-04 NOTE — TELEPHONE ENCOUNTER
10/04/24 8:01 AM        The office's request has been received, reviewed, and the patient chart updated. The PCP has successfully been removed with a patient attribution note. This message will now be completed.        Thank you  Maxine Gregory

## 2024-10-15 ENCOUNTER — HOSPITAL ENCOUNTER (OUTPATIENT)
Dept: INFUSION CENTER | Facility: CLINIC | Age: 34
Discharge: HOME | End: 2024-10-15

## 2024-10-29 ENCOUNTER — ULTRASOUND (OUTPATIENT)
Dept: OBGYN CLINIC | Facility: CLINIC | Age: 34
End: 2024-10-29
Payer: COMMERCIAL

## 2024-10-29 VITALS
DIASTOLIC BLOOD PRESSURE: 70 MMHG | WEIGHT: 133 LBS | BODY MASS INDEX: 20.88 KG/M2 | SYSTOLIC BLOOD PRESSURE: 124 MMHG | HEIGHT: 67 IN

## 2024-10-29 DIAGNOSIS — N91.2 AMENORRHEA: Primary | ICD-10-CM

## 2024-10-29 PROCEDURE — 99214 OFFICE O/P EST MOD 30 MIN: CPT | Performed by: PHYSICIAN ASSISTANT

## 2024-10-29 PROCEDURE — 76817 TRANSVAGINAL US OBSTETRIC: CPT | Performed by: PHYSICIAN ASSISTANT

## 2024-10-29 NOTE — PROGRESS NOTES
Assessment/Plan:  - Viable IUP @ 9w 4d EGA  - MIN 2025  - Continue PNV  - Patient to call for concerns  - RTO 3 weeks for OB intake    Encounter Diagnosis     ICD-10-CM    1. Amenorrhea  N91.2 Ambulatory Referral to Maternal Fetal Medicine     [facility] OB < 14 weeks single or first gestation level 1        Please choose how you are assigning the MIN: The gestational age by LMP is </= 8w 6d and demonstrates more than 5 days days difference from the gestational age by CRL, therefore the final MIN will be based on the ultrasound at this encounter      Subjective:       Patient ID: Lori Camacho 1990        Lori Camacho is a 34 y.o.  presenting to the office for pregnancy confirmation. Patient's last menstrual period was 2024 (exact date). , placing her at 8w4d today with MIN of 6-6-25. She is feeling ok today.         OB History    Para Term  AB Living   2 0 0 0 1 0   SAB IAB Ectopic Multiple Live Births   1 0 0 0 0      # Outcome Date GA Lbr Michael/2nd Weight Sex Type Anes PTL Lv   2 Current            1 SAB 24 6w0d    SAB   FD      Obstetric Comments   Menarche 12         The following portions of the patient's history were reviewed and updated as appropriate: allergies, current medications, past family history, past medical history, past social history, past surgical history, and problem list.    Allergies:  Amoxicillin and Other    Medications:    Current Outpatient Medications:     CALCIUM PO, Take 2,000 mg by mouth daily, Disp: , Rfl:     Cholecalciferol (Vitamin D) 125 MCG (5000 UT) CAPS, Take by mouth daily , Disp: , Rfl:     inFLIXimab (REMICADE) 100 mg, Inject into a catheter in a vein, Disp: , Rfl:     Prenatal Vit-Fe Fumarate-FA (PRENATAL VITAMIN PO), Take 1 tablet by mouth in the morning, Disp: , Rfl:     Current Facility-Administered Medications:     ondansetron (ZOFRAN-ODT) dispersible tablet 4 mg, 4 mg, Oral, Q6H PRN, CHARY Abreu, 4 mg at  "01/06/23 1254      Review of Systems:   Review of Systems   Constitutional:  Positive for fatigue.   Gastrointestinal:  Positive for nausea.   Genitourinary:  Negative for vaginal bleeding.          Objective:       Visit Vitals  /70   Ht 5' 7\" (1.702 m)   Wt 60.3 kg (133 lb)   LMP 08/30/2024 (Exact Date)   BMI 20.83 kg/m²   OB Status Pregnant   Smoking Status Never   BSA 1.7 m²        GEN: The patient was alert and oriented x3, pleasant well-appearing female in no acute distress.   PULM: nonlabored respirations  MSK: Normal gait  : WNL  Skin: warm, dry  Neuro: no focal deficits  Psych: normal affect and judgement, cooperative    Ultrasound:     Viability US     Gestational sac: present               Location: intrauterine  Yolk sac: present  Fetal pole: present               CRL: 2.74 cm = 9w4d  Cardiac activity: present               Rate: 178 bpm     Ovaries: normal appearing bilaterally  Cul de sac: absence of free fluid  Uterus: normal in appearance           Ultrasound Probe Disinfection    A transvaginal ultrasound was performed.   Prior to use, disinfection was performed with High Level Disinfection Process (Trophon)  Probe serial number RVRSDE: [id number] was used    Lynsey Chandra PA-C  10/29/24  2:32 PM      I have spent a total time of 30 minutes in caring for this patient on the day of the visit/encounter including Patient and family education, Documenting in the medical record, Reviewing / ordering tests, medicine, procedures  , and Obtaining or reviewing history  .    "

## 2024-10-29 NOTE — PROGRESS NOTES
The patient is here for an ultrasound for viability.     No bleeding. The patient has mild cramping.     The patient has constant nausea. No dizziness or headache.

## 2024-11-06 NOTE — PATIENT INSTRUCTIONS
Congratulations!! Please review our Pregnancy Essential Guide and [facility] L&D Virtual tour from our networks website.     [facility] Pregnancy Essentials Guide  [facility] ([email or web address])     [facility] ([employer]

## 2024-11-08 ENCOUNTER — INITIAL PRENATAL (OUTPATIENT)
Dept: OBGYN CLINIC | Facility: CLINIC | Age: 34
End: 2024-11-08

## 2024-11-08 ENCOUNTER — APPOINTMENT (OUTPATIENT)
Dept: LAB | Facility: CLINIC | Age: 34
End: 2024-11-08
Payer: COMMERCIAL

## 2024-11-08 VITALS — HEIGHT: 67 IN | BODY MASS INDEX: 20.4 KG/M2 | WEIGHT: 130 LBS

## 2024-11-08 DIAGNOSIS — O03.9 SPONTANEOUS ABORTION: ICD-10-CM

## 2024-11-08 DIAGNOSIS — Z31.430 ENCOUNTER OF FEMALE FOR TESTING FOR GENETIC DISEASE CARRIER STATUS FOR PROCREATIVE MANAGEMENT: ICD-10-CM

## 2024-11-08 DIAGNOSIS — Z34.81 PRENATAL CARE, SUBSEQUENT PREGNANCY, FIRST TRIMESTER: Primary | ICD-10-CM

## 2024-11-08 DIAGNOSIS — Z36.9 ENCOUNTER FOR ANTENATAL SCREENING: ICD-10-CM

## 2024-11-08 DIAGNOSIS — M05.79 SEROPOSITIVE RHEUMATOID ARTHRITIS OF MULTIPLE SITES (HCC): ICD-10-CM

## 2024-11-08 DIAGNOSIS — E06.3 HASHIMOTO THYROIDITIS: ICD-10-CM

## 2024-11-08 DIAGNOSIS — Z79.899 ENCOUNTER FOR LONG-TERM (CURRENT) USE OF OTHER MEDICATIONS: ICD-10-CM

## 2024-11-08 LAB
25(OH)D3 SERPL-MCNC: 22.2 NG/ML (ref 30–100)
ALBUMIN SERPL BCG-MCNC: 4.2 G/DL (ref 3.5–5)
ALP SERPL-CCNC: 31 U/L (ref 34–104)
ALT SERPL W P-5'-P-CCNC: 7 U/L (ref 7–52)
ANION GAP SERPL CALCULATED.3IONS-SCNC: 6 MMOL/L (ref 4–13)
AST SERPL W P-5'-P-CCNC: 9 U/L (ref 13–39)
B-HCG SERPL-ACNC: ABNORMAL MIU/ML (ref 0–5)
BACTERIA UR QL AUTO: ABNORMAL /HPF
BASOPHILS # BLD AUTO: 0.02 THOUSANDS/ÂΜL (ref 0–0.1)
BASOPHILS NFR BLD AUTO: 0 % (ref 0–1)
BILIRUB SERPL-MCNC: 0.67 MG/DL (ref 0.2–1)
BILIRUB UR QL STRIP: NEGATIVE
BUN SERPL-MCNC: 7 MG/DL (ref 5–25)
CALCIUM SERPL-MCNC: 9 MG/DL (ref 8.4–10.2)
CHLORIDE SERPL-SCNC: 104 MMOL/L (ref 96–108)
CLARITY UR: CLEAR
CO2 SERPL-SCNC: 25 MMOL/L (ref 21–32)
COLOR UR: YELLOW
CREAT SERPL-MCNC: 0.55 MG/DL (ref 0.6–1.3)
CRP SERPL QL: <1 MG/L
EOSINOPHIL # BLD AUTO: 0.03 THOUSAND/ÂΜL (ref 0–0.61)
EOSINOPHIL NFR BLD AUTO: 0 % (ref 0–6)
ERYTHROCYTE [DISTWIDTH] IN BLOOD BY AUTOMATED COUNT: 12.4 % (ref 11.6–15.1)
ERYTHROCYTE [SEDIMENTATION RATE] IN BLOOD: 8 MM/HOUR (ref 0–19)
GFR SERPL CREATININE-BSD FRML MDRD: 122 ML/MIN/1.73SQ M
GLUCOSE SERPL-MCNC: 110 MG/DL (ref 65–140)
GLUCOSE UR STRIP-MCNC: NEGATIVE MG/DL
HCT VFR BLD AUTO: 34 % (ref 34.8–46.1)
HGB BLD-MCNC: 11.6 G/DL (ref 11.5–15.4)
HGB UR QL STRIP.AUTO: NEGATIVE
IMM GRANULOCYTES # BLD AUTO: 0.03 THOUSAND/UL (ref 0–0.2)
IMM GRANULOCYTES NFR BLD AUTO: 0 % (ref 0–2)
KETONES UR STRIP-MCNC: ABNORMAL MG/DL
LEUKOCYTE ESTERASE UR QL STRIP: ABNORMAL
LYMPHOCYTES # BLD AUTO: 1.79 THOUSANDS/ÂΜL (ref 0.6–4.47)
LYMPHOCYTES NFR BLD AUTO: 23 % (ref 14–44)
MCH RBC QN AUTO: 31.8 PG (ref 26.8–34.3)
MCHC RBC AUTO-ENTMCNC: 34.1 G/DL (ref 31.4–37.4)
MCV RBC AUTO: 93 FL (ref 82–98)
MONOCYTES # BLD AUTO: 0.54 THOUSAND/ÂΜL (ref 0.17–1.22)
MONOCYTES NFR BLD AUTO: 7 % (ref 4–12)
MUCOUS THREADS UR QL AUTO: ABNORMAL
NEUTROPHILS # BLD AUTO: 5.52 THOUSANDS/ÂΜL (ref 1.85–7.62)
NEUTS SEG NFR BLD AUTO: 70 % (ref 43–75)
NITRITE UR QL STRIP: NEGATIVE
NON-SQ EPI CELLS URNS QL MICRO: ABNORMAL /HPF
NRBC BLD AUTO-RTO: 0 /100 WBCS
PH UR STRIP.AUTO: 5.5 [PH]
PLATELET # BLD AUTO: 256 THOUSANDS/UL (ref 149–390)
PMV BLD AUTO: 10.2 FL (ref 8.9–12.7)
POTASSIUM SERPL-SCNC: 3.6 MMOL/L (ref 3.5–5.3)
PROT SERPL-MCNC: 7 G/DL (ref 6.4–8.4)
PROT UR STRIP-MCNC: ABNORMAL MG/DL
RBC # BLD AUTO: 3.65 MILLION/UL (ref 3.81–5.12)
RBC #/AREA URNS AUTO: ABNORMAL /HPF
SODIUM SERPL-SCNC: 135 MMOL/L (ref 135–147)
SP GR UR STRIP.AUTO: 1.03 (ref 1–1.03)
UROBILINOGEN UR STRIP-ACNC: <2 MG/DL
WBC # BLD AUTO: 7.93 THOUSAND/UL (ref 4.31–10.16)
WBC #/AREA URNS AUTO: ABNORMAL /HPF

## 2024-11-08 PROCEDURE — 85025 COMPLETE CBC W/AUTO DIFF WBC: CPT

## 2024-11-08 PROCEDURE — 80053 COMPREHEN METABOLIC PANEL: CPT

## 2024-11-08 PROCEDURE — 86140 C-REACTIVE PROTEIN: CPT

## 2024-11-08 PROCEDURE — 81001 URINALYSIS AUTO W/SCOPE: CPT

## 2024-11-08 PROCEDURE — 84702 CHORIONIC GONADOTROPIN TEST: CPT

## 2024-11-08 PROCEDURE — OBC

## 2024-11-08 PROCEDURE — 85652 RBC SED RATE AUTOMATED: CPT

## 2024-11-08 NOTE — PROGRESS NOTES
OB INTAKE INTERVIEW  Patient is 34 y.o. who presents for OB intake at 11 wks  She is accompanied by herself during this encounter  The father of her baby Ross Camacho is involved in the pregnancy and is 35 years old.      Last Menstrual Period: 24  Ultrasound: Measured 9 weeks 4 days on 10/29/24  Estimated Date of Delivery: 25 changed by  9 week US    Signs/Symptoms of Pregnancy  Current pregnancy symptoms: Nausea, fatigue  Constipation no  Headaches no  Cramping/spotting no  PICA cravings no    Diabetes-  Body mass index is 20.36 kg/m².  If patient has 1 or more, please order early 1 hour GTT  History of GDM no  BMI >35 no  History of PCOS or current metformin use no  History of LGA/macrosomic infant (4000g/9lbs) no    If patient has 2 or more, please order early 1 hour GTT  BMI>30 no  AMA no  First degree relative with type 2 diabetes no  History of chronic HTN, hyperlipidemia, elevated A1C no  High risk race (, , ,  or ) no    Hypertension- if you answer yes to any of the following, please order baseline preeclampsia labs (cbc, comprehensive metabolic panel, urine protein creatinine ratio, uric acid)  History of of chronic HTN no  History of gestational HTN no  History of preeclampsia, eclampsia, or HELLP syndrome no  History of diabetes no  History of lupus,sjogrens syndrome, kidney disease no    Thyroid- if yes order TSH with reflex T4  History of thyroid disease no    Bleeding Disorder or Hx of DVT-patient or first degree relative with history of. Order the following if not done previously.   (Factor V, antithrombin III, prothrombin gene mutation, protein C and S Ag, lupus anticoagulant, anticardiolipin, beta-2 glycoprotein)   no    OB/GYN-  History of abnormal pap smear YES       Date of last pap smear 24  History of HPV YES  History of Herpes/HSV no  History of other STI (gonorrhea, chlamydia, trich) no  History of prior   no  History of prior  no  History of  delivery prior to 36 weeks 6 days no  History of Varicella or Vaccination Had vaccine  History of blood transfusion no  Ok for blood transfusion YES    Substance screening-   History of tobacco use no  Currently using tobacco no  Substance Use Screen Level (N/A, LOW, HIGH) N/A    MRSA Screening-   Does the pt have a hx of MRSA? no    Immunizations:  Influenza vaccine given this season Had this season.  Discussed Tdap vaccine YES  Discussed COVID Vaccine YES    Genetic/M-  Do you or your partner have a history of any of the following in yourselves or first degree relatives?  Cystic fibrosis no  Spinal muscular atrophy no  Hemoglobinopathy/Sickle Cell/Thalassemia no  Fragile X Intellectual Disability no        If no, discuss Carrier Screening being completed once in a lifetime as a standard of care lab test. Place orders for Cystic Fibrosis Gene Test ([id number]) and Spinal Muscular Atrophy DNA ([id number])      Appointment for Nuchal Translucency Ultrasound at [facility] scheduled for 24      Interview education  [facility] Pregnancy Essentials Book reviewed, discussed and attached to their AVS YES    Nurse/Family Partnership- patient may qualify NO; referral placed NO    Prenatal lab work scripts YES  Extra labs ordered:  CF and SMA screening    Aspirin/Preeclampsia Screen    Risk Level Risk Factor Recommendation   LOW Prior Uncomplicated full-term delivery no No Aspirin recommendation        MODERATE Nulliparity YES Recommend low-dose aspirin if     BMI>30 no 2 or more moderate risk factors    Family History Preeclampsia (mother/sister) no     35yr old or greater no     Black Race, Concern for SDOH/Low Socioeconomic no     IVF Pregnancy  no     Personal History Risks (low birth weight, prior adverse preg outcome, >10yr preg interval) no         HIGH History of Preeclampsia no Recommend low-dose aspirin if     Multifetal gestation no 1 or more high risk factors     Chronic HTN no     Type 1 or 2 Diabetes no     Renal Disease no     Autoimmune Disease  YES- RA      Contraindications to ASA therapy:  NSAID/ ASA allergy: no  Nasal polyps: no  Asthma with history of ASA induced bronchospasm: no  Relative contraindications:  History of GI bleed: no  Active peptic ulcer disease: no  Severe hepatic dysfunction: no    Patient should be recommended to take ASA 162mg during this pregnancy from 12-36wks to lower her risk of preeclampsia: ASA therapy discussed.      The patient has a history now or in prior pregnancy notable for:  Rheumatoid arthritis, EPDS-5        Details that I feel the provider should be aware of: This is a planned and welcomed pregnancy for parents. Patient is experiencing some nausea. OTC medications and diet were discussed. Patient has Rheumatoid arthritis and stopped remicade with confirmed pregnancy. Patient knows to call OB for symptoms and how to contact her nurse navigator. Patient was made aware to have lab orders completed before next appointment. Patient denies any questions at this point and verbalizes understanding.         PN1 visit scheduled. The patient was oriented to our practice, the navigator role, reviewed delivering physicians and [facility] for Delivery. All questions were answered.    Interviewed by: Marleny Cortez RN

## 2024-11-12 ENCOUNTER — OFFICE VISIT (OUTPATIENT)
Age: 34
End: 2024-11-12
Payer: COMMERCIAL

## 2024-11-12 VITALS
OXYGEN SATURATION: 100 % | DIASTOLIC BLOOD PRESSURE: 52 MMHG | BODY MASS INDEX: 20.44 KG/M2 | HEART RATE: 94 BPM | HEIGHT: 67 IN | SYSTOLIC BLOOD PRESSURE: 114 MMHG | TEMPERATURE: 96.7 F | WEIGHT: 130.2 LBS

## 2024-11-12 DIAGNOSIS — M05.79 SEROPOSITIVE RHEUMATOID ARTHRITIS OF MULTIPLE SITES (HCC): Primary | ICD-10-CM

## 2024-11-12 DIAGNOSIS — E55.9 VITAMIN D INSUFFICIENCY: ICD-10-CM

## 2024-11-12 PROCEDURE — 99214 OFFICE O/P EST MOD 30 MIN: CPT | Performed by: PHYSICIAN ASSISTANT

## 2024-11-12 NOTE — ASSESSMENT & PLAN NOTE
Her rheumatoid arthritis is currently quiescent.  She is currently off her Remicade infusions as she is pregnant and due at the end of May 2025.  We will continue to monitor her RA symptoms closely off of medication while she is pregnant.  Will plan to follow-up in 4 months or sooner if needed.

## 2024-11-12 NOTE — PROGRESS NOTES
Ambulatory Visit  Name: Lori Camacho      : 1990      MRN: [id number]  Encounter Provider: Lori Sethi PA-C  Encounter Date: 2024   Encounter department: [facility] RHEUMATOLOGY [facility]    Assessment & Plan  Seropositive rheumatoid arthritis of multiple sites (HCC)  Her rheumatoid arthritis is currently quiescent.  She is currently off her Remicade infusions as she is pregnant and due at the end of May 2025.  We will continue to monitor her RA symptoms closely off of medication while she is pregnant.  Will plan to follow-up in 4 months or sooner if needed.       Vitamin D insufficiency  Recommend adding back over-the-counter vitamin D3 supplement.         History of Present Illness     Lori Camacho is a 34 y.o. female.  She is here for follow-up of her seropositive rheumatoid arthritis.  She has been on Remicade since .  She is currently 11 weeks pregnant (due at the end of May 2025) and has stopped her Remicade infusions.  Her last Remicade infusion was done on 2024.  She has been feeling well in regards to her RA and has not had any joint pain or swelling.  She does have nausea and fatigue related to pregnancy.     She has a history of positive thyroid antibodies however her thyroid function testing has been within normal limits.  She has a history of exercise-induced asthma as well.  This has been stable.    She had labs done on 2024.  CBC, CMP essentially unremarkable.  ESR, CRP within normal limits.  Vitamin D 22.2.      Review of Systems   Constitutional:  Negative for chills, fatigue and fever.   HENT:  Negative for hearing loss, sore throat and tinnitus.    Eyes:  Negative for pain and visual disturbance.   Respiratory:  Negative for cough and shortness of breath.    Cardiovascular:  Negative for chest pain and palpitations.   Gastrointestinal:  Negative for abdominal pain, nausea and vomiting.   Genitourinary:  Negative for difficulty  "urinating.   Musculoskeletal:  Negative for arthralgias, back pain, gait problem, joint swelling, myalgias, neck pain and neck stiffness.   Skin:  Negative for rash.   Neurological:  Negative for dizziness, seizures, weakness, numbness and headaches.   Psychiatric/Behavioral:  Negative for confusion, decreased concentration and sleep disturbance.      Current Outpatient Medications on File Prior to Visit   Medication Sig Dispense Refill    CALCIUM PO Take 2,000 mg by mouth daily      Cholecalciferol (Vitamin D) 125 MCG (5000 UT) CAPS Take by mouth daily      inFLIXimab (REMICADE) 100 mg Inject into a catheter in a vein Currently pregnant so not taking, will continue after pregnancy      Prenatal Vit-Fe Fumarate-FA (PRENATAL VITAMIN PO) Take 1 tablet by mouth in the morning       Current Facility-Administered Medications on File Prior to Visit   Medication Dose Route Frequency Provider Last Rate Last Admin    ondansetron (ZOFRAN-ODT) dispersible tablet 4 mg  4 mg Oral Q6H PRN CHARY Abreu   4 mg at 01/06/23 1254          Objective     /52 (BP Location: Right arm, Patient Position: Sitting, Cuff Size: Standard)   Pulse 94   Temp (!) 96.7 °F (35.9 °C) (Temporal)   Ht 5' 6.5\" (1.689 m)   Wt 59.1 kg (130 lb 3.2 oz)   LMP 08/30/2024 (Exact Date)   SpO2 100%   BMI 20.70 kg/m²     Physical Exam  Constitutional:       Appearance: Normal appearance.   Cardiovascular:      Rate and Rhythm: Normal rate and regular rhythm.   Pulmonary:      Breath sounds: Normal breath sounds.   Musculoskeletal:         General: No swelling or tenderness.      Comments: : Full range of motion neck, bilateral shoulders, elbows, wrists.  No synovitis noted in hands.  Full range of motion bilateral hips, knees, ankles.  No synovitis noted in feet.   Skin:     General: Skin is warm and dry.   Neurological:      General: No focal deficit present.      Mental Status: She is alert.           Dragon Dictation software was used " to dictate this note. It may contain errors with dictating incorrect words/spelling. Please contact provider directly for any questions.

## 2024-11-13 ENCOUNTER — APPOINTMENT (OUTPATIENT)
Dept: LAB | Facility: CLINIC | Age: 34
End: 2024-11-13
Payer: COMMERCIAL

## 2024-11-13 DIAGNOSIS — Z34.81 PRENATAL CARE, SUBSEQUENT PREGNANCY, FIRST TRIMESTER: ICD-10-CM

## 2024-11-13 LAB
ABO GROUP BLD: NORMAL
BACTERIA UR QL AUTO: ABNORMAL /HPF
BASOPHILS # BLD AUTO: 0.04 THOUSANDS/ÂΜL (ref 0–0.1)
BASOPHILS NFR BLD AUTO: 1 % (ref 0–1)
BILIRUB UR QL STRIP: NEGATIVE
BLD GP AB SCN SERPL QL: NEGATIVE
CLARITY UR: CLEAR
COLOR UR: ABNORMAL
EOSINOPHIL # BLD AUTO: 0.03 THOUSAND/ÂΜL (ref 0–0.61)
EOSINOPHIL NFR BLD AUTO: 0 % (ref 0–6)
ERYTHROCYTE [DISTWIDTH] IN BLOOD BY AUTOMATED COUNT: 12.4 % (ref 11.6–15.1)
GLUCOSE UR STRIP-MCNC: NEGATIVE MG/DL
HBV SURFACE AG SER QL: NORMAL
HCT VFR BLD AUTO: 34 % (ref 34.8–46.1)
HCV AB SER QL: NORMAL
HGB BLD-MCNC: 11.8 G/DL (ref 11.5–15.4)
HGB UR QL STRIP.AUTO: ABNORMAL
HIV 1+2 AB+HIV1 P24 AG SERPL QL IA: NORMAL
HIV 2 AB SERPL QL IA: NORMAL
HIV1 AB SERPL QL IA: NORMAL
HIV1 P24 AG SERPL QL IA: NORMAL
IMM GRANULOCYTES # BLD AUTO: 0.02 THOUSAND/UL (ref 0–0.2)
IMM GRANULOCYTES NFR BLD AUTO: 0 % (ref 0–2)
KETONES UR STRIP-MCNC: ABNORMAL MG/DL
LEUKOCYTE ESTERASE UR QL STRIP: ABNORMAL
LYMPHOCYTES # BLD AUTO: 1.78 THOUSANDS/ÂΜL (ref 0.6–4.47)
LYMPHOCYTES NFR BLD AUTO: 23 % (ref 14–44)
MCH RBC QN AUTO: 32.2 PG (ref 26.8–34.3)
MCHC RBC AUTO-ENTMCNC: 34.7 G/DL (ref 31.4–37.4)
MCV RBC AUTO: 93 FL (ref 82–98)
MONOCYTES # BLD AUTO: 0.56 THOUSAND/ÂΜL (ref 0.17–1.22)
MONOCYTES NFR BLD AUTO: 7 % (ref 4–12)
MUCOUS THREADS UR QL AUTO: ABNORMAL
NEUTROPHILS # BLD AUTO: 5.19 THOUSANDS/ÂΜL (ref 1.85–7.62)
NEUTS SEG NFR BLD AUTO: 69 % (ref 43–75)
NITRITE UR QL STRIP: NEGATIVE
NON-SQ EPI CELLS URNS QL MICRO: ABNORMAL /HPF
NRBC BLD AUTO-RTO: 0 /100 WBCS
PH UR STRIP.AUTO: 5.5 [PH]
PLATELET # BLD AUTO: 268 THOUSANDS/UL (ref 149–390)
PMV BLD AUTO: 10.2 FL (ref 8.9–12.7)
PROT UR STRIP-MCNC: NEGATIVE MG/DL
RBC # BLD AUTO: 3.67 MILLION/UL (ref 3.81–5.12)
RBC #/AREA URNS AUTO: ABNORMAL /HPF
RH BLD: POSITIVE
RUBV IGG SERPL IA-ACNC: 432.5 IU/ML
SP GR UR STRIP.AUTO: 1.03 (ref 1–1.03)
TREPONEMA PALLIDUM IGG+IGM AB [PRESENCE] IN SERUM OR PLASMA BY IMMUNOASSAY: NORMAL
UROBILINOGEN UR STRIP-ACNC: <2 MG/DL
WBC # BLD AUTO: 7.62 THOUSAND/UL (ref 4.31–10.16)
WBC #/AREA URNS AUTO: ABNORMAL /HPF

## 2024-11-13 PROCEDURE — 86803 HEPATITIS C AB TEST: CPT

## 2024-11-13 PROCEDURE — 86850 RBC ANTIBODY SCREEN: CPT

## 2024-11-13 PROCEDURE — 87086 URINE CULTURE/COLONY COUNT: CPT

## 2024-11-13 PROCEDURE — 86901 BLOOD TYPING SEROLOGIC RH(D): CPT

## 2024-11-13 PROCEDURE — 81001 URINALYSIS AUTO W/SCOPE: CPT

## 2024-11-13 PROCEDURE — 36415 COLL VENOUS BLD VENIPUNCTURE: CPT

## 2024-11-13 PROCEDURE — 87340 HEPATITIS B SURFACE AG IA: CPT

## 2024-11-13 PROCEDURE — 86762 RUBELLA ANTIBODY: CPT

## 2024-11-13 PROCEDURE — 86900 BLOOD TYPING SEROLOGIC ABO: CPT

## 2024-11-13 PROCEDURE — 87389 HIV-1 AG W/HIV-1&-2 AB AG IA: CPT

## 2024-11-13 PROCEDURE — 85025 COMPLETE CBC W/AUTO DIFF WBC: CPT

## 2024-11-13 PROCEDURE — 86780 TREPONEMA PALLIDUM: CPT

## 2024-11-14 ENCOUNTER — RESULTS FOLLOW-UP (OUTPATIENT)
Dept: OBGYN CLINIC | Facility: CLINIC | Age: 34
End: 2024-11-14

## 2024-11-14 NOTE — PATIENT INSTRUCTIONS
You elected to have non-invasive prenatal screening (NIPS). This involves a blood test to check for the four most common genetic syndromes (Trisomy 21, Trisomy 13, Trisomy 18, and sex chromosome abnormalities). It also MAY report the biologic sex of the fetus if you opted to learn this information. You can call our office to verbally review results to avoid inadvertently learning this information via Vupen, if desired. Results will be visible in your Post.Bid.Ship portal 5-7 business days from when the test is drawn. Please follow all instructions regarding insurance cost/coverage provided to you today. Please contact our office with any concerns or questions. You will need spina bifida screening (called MSAFP) for the baby beginning at 15 weeks gestation, which will be ordered by your obstetrician's office. This test allows for earlier detection of spina bifida than is possible by ultrasound, and is advised in all pregnancies.  Low dose aspirin, when started early in pregnancy, can reduce your risk of (prevent) preeclampsia.  General dose recommendation is 81mg or 162mg daily.  Side effects are generally none to mild, however, if you experience what you think may be an allergic reaction after starting aspirin, immediately stop it and notify your doctor.  If you have asthma or acid reflux and notice an increase in symptom frequency or severity, consider stopping this medication, and notify your doctor.  If you have had bariatric surgery, you may need a different dose of medication with or without acid-reducing medication.  We advise routine discontinuation of this medication at 36 weeks.  For more resources, visit:  https://[email or web address]-sheets/low-[email or web address]-aspirin/  [email or web address]://www.preeclampsia.org/aspirin  https://www.highriskpregnancyinfo.org/preeclampsia   Thank you for choosing us for your  care today.  If you have any questions about your ultrasound or care, please do not hesitate to contact us or your  primary obstetrician.        Some general instructions for your pregnancy are:    Exercise: Aim for 150 minutes per week of regular exercise.  Walking is great!  Nutrition: Choose healthy sources of calcium, iron, and protein.  Avoid ultraprocessed foods and added sugar.  Learn about Preeclampsia: preeclampsia is a common, potentially serious high blood pressure complication in pregnancy.  A blood pressure of 140mmHg (systolic or top number) or 90mmHg (diastolic or bottom number) should be evaluated by your doctor.  Aspirin is sometimes prescribed in early pregnancy to prevent preeclampsia in women with risk factors - ask your obstetrician if you should be on this medication.  For more resources, visit:  https://www.highriskpregnancyinfo.org/preeclampsia  If you smoke, please try to quit completely but also try to reduce your smoking by as much as possible (as soon as possible).  Do not vape.  Please also avoid cannabis products.  Other warning signs to watch out for in pregnancy or postpartum: chest pain, obstructed breathing or shortness of breath, seizures, thoughts of hurting yourself or your baby, bleeding, a painful or swollen leg, fever, or headache (see [facility] POST-BIRTH Warning Signs campaign).  If these happen call 911.  Itching is also not normal in pregnancy and if you experience this, especially over your hands and feet, potentially worse at night, notify your doctors.

## 2024-11-15 LAB — BACTERIA UR CULT: NORMAL

## 2024-11-19 ENCOUNTER — ROUTINE PRENATAL (OUTPATIENT)
Facility: HOSPITAL | Age: 34
End: 2024-11-19
Payer: COMMERCIAL

## 2024-11-19 VITALS
HEART RATE: 101 BPM | BODY MASS INDEX: 20.45 KG/M2 | DIASTOLIC BLOOD PRESSURE: 60 MMHG | SYSTOLIC BLOOD PRESSURE: 120 MMHG | OXYGEN SATURATION: 99 % | HEIGHT: 67 IN | WEIGHT: 130.29 LBS

## 2024-11-19 DIAGNOSIS — E06.3 AUTOIMMUNE THYROIDITIS: ICD-10-CM

## 2024-11-19 DIAGNOSIS — N91.2 AMENORRHEA: ICD-10-CM

## 2024-11-19 DIAGNOSIS — O09.521 SUPERVISION OF ELDERLY MULTIGRAVIDA IN FIRST TRIMESTER: Primary | ICD-10-CM

## 2024-11-19 DIAGNOSIS — Z36.82 ENCOUNTER FOR (NT) NUCHAL TRANSLUCENCY SCAN: ICD-10-CM

## 2024-11-19 DIAGNOSIS — Z3A.12 12 WEEKS GESTATION OF PREGNANCY: ICD-10-CM

## 2024-11-19 PROBLEM — Z30.42 ENCOUNTER FOR SURVEILLANCE OF INJECTABLE CONTRACEPTIVE: Status: RESOLVED | Noted: 2019-05-24 | Resolved: 2024-11-19

## 2024-11-19 PROCEDURE — 76813 OB US NUCHAL MEAS 1 GEST: CPT | Performed by: OBSTETRICS & GYNECOLOGY

## 2024-11-19 PROCEDURE — 36415 COLL VENOUS BLD VENIPUNCTURE: CPT | Performed by: OBSTETRICS & GYNECOLOGY

## 2024-11-19 PROCEDURE — 99244 OFF/OP CNSLTJ NEW/EST MOD 40: CPT | Performed by: OBSTETRICS & GYNECOLOGY

## 2024-11-19 RX ORDER — ASPIRIN 81 MG/1
162 TABLET, CHEWABLE ORAL DAILY
Qty: 60 TABLET | Refills: 5 | Status: SHIPPED | OUTPATIENT
Start: 2024-11-19

## 2024-11-19 NOTE — PROGRESS NOTES
Patient chose to have [facility] [id number] Non-Invasive Prenatal Screen [id number] PLUS w/ SCA, WITH fetal sex.  Patient choose to be billed through insurance.     Patient given brochure and is aware [facility] will contact patient's insurance and coordinate coverage.  Provided [facility] contact information. General inquiries [phone], Cost estimates [phone] and [facility] Billing [phone]. Website [email or web address].     Blood collection tubes labeled with patient identifiers (name, medical record number, and date of birth).     Filled out [facility] order form. Patient chose to have blood drawn in our office at time of visit. NIPS was drawn from left arm with a butterfly needle by Thi MILLIGAN RN.       If patient chose to have blood work drawn at a [facility] lab we requested patient notify MFM (via phone call or Boutir message) when blood collected so office can follow up on results.       Maternal Fetal Medicine will have results in approximately 5-7 business days and will call patient or notify via Boutir.  Patient aware viewing lab result online will reveal fetal sex if ordered.    Patient verbalized understanding of all instructions and no questions at this time.

## 2024-11-19 NOTE — PROGRESS NOTES
"[facility]: Ms. Camacho was seen today at 12w4d for nuchal translucency ultrasound.  See ultrasound report under \"OB Procedures\" tab.      My recommendations are as follows:  We reviewed the availability of aneuploidy screening, as well as diagnostic testing, which are available to all pregnant women. We reviewed limitations, risks, and benefits of screening and testing. She elected to proceed with Non Invasive Prenatal Screening (NIPS). MSAFP screening should be ordered through your office at 15-20 weeks gestation, and completed prior to fetal anatomic survey. She does not wish to pursue diagnostic testing at this time.  A detailed anatomic survey as well as transvaginal cervical length screening are recommended around 20 weeks gestation.  Please offer carrier screening per ACOG guidelines.     Given history of euthyroid Hashimoto's thyroiditis, I recommended TSH with reflex to Free T4, which was ordered.     The use of low dose aspirin in pregnancy (162mg) is recommended in women with a high risk, or multiple moderate risk factors for preeclampsia and was recommended (primigravida and autoimmune disease are her risk factors). Aspirin therapy should be initiated between 12-28 weeks gestation, and is most effective if started prior to 16 weeks gestation, and stopped by 36 weeks gestation. Low dose aspirin in pregnancy has been shown to reduce the incidence of preeclampsia in women with risk factors, and has been shown to be safe and without significant maternal or fetal risk.     Please don't hesitate to contact our office with any concerns or questions.    -Ariane Neumann MD  "

## 2024-11-19 NOTE — LETTER
2024     Lynsey Chandra PA-C  [address] [facility]  [address]  [place] [address]    Patient: Lori Camacho   YOB: 1990   Date of Visit: 2024       Dear CHANDRIKA Chandra:    Thank you for referring Lori Camacho to me for evaluation. Below are my notes for this consultation.    If you have questions, please do not hesitate to call me. I look forward to following your patient along with you.         Sincerely,        Ariane Neumann MD        CC: No Recipients    Juliana Reyes  2024  1:57 PM  Sign when Signing Visit  Patient chose to have [facility] [id number] Non-Invasive Prenatal Screen [id number] PLUS w/ SCA, WITH fetal sex.  Patient choose to be billed through insurance.     Patient given brochure and is aware [facility] will contact patient's insurance and coordinate coverage.  Provided [facility] contact information. General inquiries [phone], Cost estimates [phone] and [facility] Billing [phone]. Website [email or web address] Therapeuticsth.My Pick Box.[employer].     Blood collection tubes labeled with patient identifiers (name, medical record number, and date of birth).     Filled out [facility] order form. Patient chose to have blood drawn in our office at time of visit. NIPS was drawn from left arm with a butterfly needle by Thi MILLIGAN RN.       If patient chose to have blood work drawn at a [facility] lab we requested patient notify MFM (via phone call or Snoball message) when blood collected so office can follow up on results.       Maternal Fetal Medicine will have results in approximately 5-7 business days and will call patient or notify via Snoball.  Patient aware viewing lab result online will reveal fetal sex if ordered.    Patient verbalized understanding of all instructions and no questions at this time.     Ariane Neumann MD  2024 10:02 AM  Sign when Signing Visit  [facility]  Center: Ms. Camacho was seen today at 12w4d for nuchal  "translucency ultrasound.  See ultrasound report under \"OB Procedures\" tab.      My recommendations are as follows:  We reviewed the availability of aneuploidy screening, as well as diagnostic testing, which are available to all pregnant women. We reviewed limitations, risks, and benefits of screening and testing. She elected to proceed with Non Invasive Prenatal Screening (NIPS). MSAFP screening should be ordered through your office at 15-20 weeks gestation, and completed prior to fetal anatomic survey. She does not wish to pursue diagnostic testing at this time.  A detailed anatomic survey as well as transvaginal cervical length screening are recommended around 20 weeks gestation.  Please offer carrier screening per ACOG guidelines.     Given history of euthyroid Hashimoto's thyroiditis, I recommended TSH with reflex to Free T4, which was ordered.     The use of low dose aspirin in pregnancy (162mg) is recommended in women with a high risk, or multiple moderate risk factors for preeclampsia and was recommended (primigravida and autoimmune disease are her risk factors). Aspirin therapy should be initiated between 12-28 weeks gestation, and is most effective if started prior to 16 weeks gestation, and stopped by 36 weeks gestation. Low dose aspirin in pregnancy has been shown to reduce the incidence of preeclampsia in women with risk factors, and has been shown to be safe and without significant maternal or fetal risk.     Please don't hesitate to contact our office with any concerns or questions.    -Ariane Neumann MD  "

## 2024-11-22 ENCOUNTER — RESULTS FOLLOW-UP (OUTPATIENT)
Facility: HOSPITAL | Age: 34
End: 2024-11-22

## 2024-11-22 ENCOUNTER — APPOINTMENT (OUTPATIENT)
Dept: LAB | Facility: CLINIC | Age: 34
End: 2024-11-22
Payer: COMMERCIAL

## 2024-11-22 DIAGNOSIS — Z3A.12 12 WEEKS GESTATION OF PREGNANCY: ICD-10-CM

## 2024-11-22 DIAGNOSIS — Z31.430 ENCOUNTER OF FEMALE FOR TESTING FOR GENETIC DISEASE CARRIER STATUS FOR PROCREATIVE MANAGEMENT: ICD-10-CM

## 2024-11-22 DIAGNOSIS — Z36.9 ENCOUNTER FOR ANTENATAL SCREENING: ICD-10-CM

## 2024-11-22 DIAGNOSIS — E06.3 AUTOIMMUNE THYROIDITIS: ICD-10-CM

## 2024-11-22 DIAGNOSIS — R79.89 LOW TSH LEVEL: Primary | ICD-10-CM

## 2024-11-22 LAB
T4 FREE SERPL-MCNC: 1.62 NG/DL (ref 0.61–1.12)
TSH SERPL DL<=0.05 MIU/L-ACNC: <0.01 UIU/ML (ref 0.45–4.5)

## 2024-11-22 PROCEDURE — 81220 CFTR GENE COM VARIANTS: CPT

## 2024-11-22 PROCEDURE — 84439 ASSAY OF FREE THYROXINE: CPT

## 2024-11-22 PROCEDURE — 84443 ASSAY THYROID STIM HORMONE: CPT

## 2024-11-22 PROCEDURE — 81329 SMN1 GENE DOS/DELETION ALYS: CPT

## 2024-11-22 PROCEDURE — 36415 COLL VENOUS BLD VENIPUNCTURE: CPT

## 2024-11-23 LAB
CFDNA.FET/CFDNA.TOTAL SFR FETUS: NORMAL %
CITATION REF LAB TEST: NORMAL
FET 13+18+21+X+Y ANEUP PLAS.CFDNA: NEGATIVE
FET CHR 21 TS PLAS.CFDNA QL: NEGATIVE
FET CHR 21 TS PLAS.CFDNA QL: NEGATIVE
FET MS X RISK WBC.DNA+CFDNA QL: NOT DETECTED
FET SEX PLAS.CFDNA DOSAGE CFDNA: NORMAL
FET TS 13 RISK PLAS.CFDNA QL: NEGATIVE
FET X + Y ANEUP RISK PLAS.CFDNA SEQ-IMP: NOT DETECTED
GA EST FROM CONCEPTION DATE: NORMAL D
GESTATIONAL AGE > 9:: YES
LAB DIRECTOR NAME PROVIDER: NORMAL
LAB DIRECTOR NAME PROVIDER: NORMAL
LABORATORY COMMENT REPORT: NORMAL
LIMITATIONS OF THE TEST: NORMAL
NEGATIVE PREDICTIVE VALUE: NORMAL
PERFORMANCE CHARACTERISTICS: NORMAL
POSITIVE PREDICTIVE VALUE: NORMAL
REF LAB TEST METHOD: NORMAL
SL AMB NOTE:: NORMAL
TEST PERFORMANCE INFO SPEC: NORMAL

## 2024-11-25 ENCOUNTER — APPOINTMENT (OUTPATIENT)
Dept: LAB | Facility: CLINIC | Age: 34
End: 2024-11-25
Payer: COMMERCIAL

## 2024-11-25 ENCOUNTER — DOCUMENTATION (OUTPATIENT)
Dept: PERINATAL CARE | Facility: OTHER | Age: 34
End: 2024-11-25

## 2024-11-25 DIAGNOSIS — E05.90 HYPERTHYROIDISM AFFECTING PREGNANCY IN FIRST TRIMESTER: Primary | ICD-10-CM

## 2024-11-25 DIAGNOSIS — E05.90 HYPERTHYROIDISM AFFECTING PREGNANCY IN FIRST TRIMESTER: ICD-10-CM

## 2024-11-25 DIAGNOSIS — O99.281 HYPERTHYROIDISM AFFECTING PREGNANCY IN FIRST TRIMESTER: Primary | ICD-10-CM

## 2024-11-25 DIAGNOSIS — O99.281 HYPERTHYROIDISM AFFECTING PREGNANCY IN FIRST TRIMESTER: ICD-10-CM

## 2024-11-25 PROBLEM — O99.280 HYPERTHYROIDISM AFFECTING PREGNANCY: Status: ACTIVE | Noted: 2024-11-25

## 2024-11-25 PROCEDURE — 84445 ASSAY OF TSI GLOBULIN: CPT

## 2024-11-25 PROCEDURE — 36415 COLL VENOUS BLD VENIPUNCTURE: CPT

## 2024-11-25 NOTE — PROGRESS NOTES
I called Lori to review suppressed TSH and elevated FreeT4, suggestive of hyperthyroidism. I ordered thyroid stimulating immunoglobulins as well after discussion with Dr. Rhodes. Referral to endocrinology was placed. Lroi will complete TSI lab work today. We discussed that methimazole is typically treatment of hyperthyroidism (if indicated) in the second and third trimesters of pregnancy, she is 13w3d, so would anticipate methimazole treatment. We discussed monitoring of the fetus for tachycardia and goiter by ultrasound in the case of maternal Graves disease. She was receptive to recommendations without further questions at this time.  Ariane Neumann MD

## 2024-11-25 NOTE — RESULT ENCOUNTER NOTE
FYI since you're seeing her for next prenatal visit. Labs consistent with hyperthryoidism, I have reached out to endocrinology for recommendations on treatment.

## 2024-11-25 NOTE — RESULT ENCOUNTER NOTE
Dear CHANDRIKA Stewart,  I've reviewed this NIPS result which is low-risk. Patient was notified via [employer]. MSAFP (spina bifida screening) is recommended to be completed between 15-20 weeks gestation (and prior fetal anatomic survey). You are scheduled to see her for her next OB visit, can you please order and review recommendation? Thank you.   Ariane Neumann MD

## 2024-11-27 LAB
CITATION REF LAB TEST: NORMAL
CLINICAL INFO: NORMAL
ETHNIC BACKGROUND STATED: NORMAL
GENE DIS ANL CARRIER INTERP-IMP: NORMAL
GENE MUT TESTED BLD/T: NORMAL
LAB DIRECTOR NAME PROVIDER: NORMAL
REASON FOR REFERRAL (NARRATIVE): NORMAL
RECOMMENDATION PATIENT DOC-IMP: NORMAL
REF LAB TEST METHOD: NORMAL
SERVICE CMNT-IMP: NORMAL
SMN1 GENE MUT ANL BLD/T: NORMAL
SPECIMEN SOURCE: NORMAL

## 2024-11-28 LAB — TSI SER-ACNC: <0.1 IU/L (ref 0–0.55)

## 2024-11-29 ENCOUNTER — RESULTS FOLLOW-UP (OUTPATIENT)
Dept: PERINATAL CARE | Facility: OTHER | Age: 34
End: 2024-11-29

## 2024-11-29 ENCOUNTER — CONSULT (OUTPATIENT)
Dept: ENDOCRINOLOGY | Facility: CLINIC | Age: 34
End: 2024-11-29
Payer: COMMERCIAL

## 2024-11-29 VITALS
OXYGEN SATURATION: 99 % | WEIGHT: 131.6 LBS | DIASTOLIC BLOOD PRESSURE: 62 MMHG | SYSTOLIC BLOOD PRESSURE: 110 MMHG | HEART RATE: 93 BPM | BODY MASS INDEX: 20.65 KG/M2 | HEIGHT: 67 IN

## 2024-11-29 DIAGNOSIS — O99.281 HYPERTHYROIDISM AFFECTING PREGNANCY IN FIRST TRIMESTER: ICD-10-CM

## 2024-11-29 DIAGNOSIS — E05.90 HYPERTHYROIDISM AFFECTING PREGNANCY IN FIRST TRIMESTER: ICD-10-CM

## 2024-11-29 DIAGNOSIS — E05.90 HYPERTHYROIDISM AFFECTING PREGNANCY IN SECOND TRIMESTER: Primary | ICD-10-CM

## 2024-11-29 DIAGNOSIS — O99.282 HYPERTHYROIDISM AFFECTING PREGNANCY IN SECOND TRIMESTER: Primary | ICD-10-CM

## 2024-11-29 PROCEDURE — 99244 OFF/OP CNSLTJ NEW/EST MOD 40: CPT | Performed by: INTERNAL MEDICINE

## 2024-11-29 NOTE — PROGRESS NOTES
New Patient Progress Note      Referring Provider  Ariane Neumann Md  [address]  [address]  [place] [address]     1. Hyperthyroidism affecting pregnancy in second trimester  -     TSH, 3rd generation; Future; Expected date: 12/27/2024  -     T4, free; Future; Expected date: 12/27/2024  2. Hyperthyroidism affecting pregnancy in first trimester  -     Ambulatory Referral to Endocrinology    Patient does not have significant symptoms of thyrotoxicosis.  Thyroid function testing and antibodies consistent with maternal hyperthyroidism.  TSI negative, differentials include transient hCG mediated thyrotoxicosis of pregnancy vs Graves' disease unlikely.    Given mild symptomatology, significant uncontrolled nausea and hCG elevation, along with negative TSI it is most likely gestational transient thyrotoxicosis which usually starts improving at 14 to 18 weeks of gestation and thioamide therapy has not been proven beneficial in this case for obstetric outcomes.  Hence will hold off on prescribing antithyroid medication.  Since the patient is currently at 14 weeks of gestation, will repeat TSH and free T4 in 4 weeks and if normal, again at 8 weeks following which she may not require further monitoring of thyroid function if it continues to be normal.  Discussed with patient that free T4 will normalize initially then TSH.  Follow-up in 3-months    History of Present Illness:     Patient is a 34-year-old female with past medical history of cervical dysplasia s/p cryotherapy, rheumatoid arthritis, vitamin D deficiency who has been referred by [facility] for evaluation and management of hyperthyroidism during pregnancy.  Patient is currently 14 weeks pregnant, it is her first pregnancy.  Labs performed by obstetrics/MFM revealed abnormal thyroid function testing with undetectable TSH and high free T4 at 1.6 recently. They reached out to Dr Rhodes and our office had advised to check TSI which came back  negative.  She has a history of positive thyroglobulin antibodies in the past, TPO negative, did not have clinical symptoms or abnormal thyroid function testing previously. No thyroid US in the past     PT c/o fatigue that she associates with pregnancy, feeling cold and cold intolerance which is chronic, constipation that started with pregnancy. States she has had decreased appetite due to nausea in the first trimester and lost a couple of pounds. Has not gained weight since she got pregnant.   Denies hair skin nail changes, anxiety, palpitations, tremors, pressure symptoms in the throat or voice changes, blurry vision.  Recent infections: no  Patient has a history of congenital hypothyroidism in her niece. No other family history of thyroid dysfunction.  No History of external radiation to head/neck/chest.  No family history of thyroid cancer. No recent Iodine loading in form of medication, herbs or kelp supplements or radiological diagnostic studies.    Patient Active Problem List   Diagnosis    ANDREA III (cervical intraepithelial neoplasia grade III) with severe dysplasia    Seropositive rheumatoid arthritis of multiple sites (HCC)    Vitamin D insufficiency    Exercise-induced asthma    Autoimmune thyroiditis    Preauricular adenopathy    Seasonal allergies    12 weeks gestation of pregnancy    Low TSH level    Hyperthyroidism affecting pregnancy     Past Medical History:   Diagnosis Date    Abnormal Pap smear of cervix     Asthma     exercise induced    Exercise-induced asthma     HPV (human papilloma virus) infection     Pregnancy 11/12/2024    RA (rheumatoid arthritis) (HCC)     Seasonal allergies       Past Surgical History:   Procedure Laterality Date    CERVICAL BIOPSY  W/ LOOP ELECTRODE EXCISION  Cryo surgery    2017    DENTAL SURGERY        Family History   Problem Relation Age of Onset    No Known Problems Mother     No Known Problems Father     No Known Problems Sister     No Known Problems Sister     No  "Known Problems Maternal Grandmother     Other Niece         congenital hypothyroidism     Social History     Tobacco Use    Smoking status: Never    Smokeless tobacco: Never   Substance Use Topics    Alcohol use: Not Currently     Alcohol/week: 1.0 standard drink of alcohol     Types: 1 Glasses of wine per week     Comment: occasional     Allergies   Allergen Reactions    Amoxicillin Other (See Comments)     Childhood allergy     Other Other (See Comments)     Seasonal- Runny nose/ congestion      [unfilled]    Review of Systems   Constitutional:  Positive for fatigue and unexpected weight change. Negative for activity change and appetite change.   HENT:  Negative for trouble swallowing and voice change.    Eyes:  Negative for photophobia and visual disturbance.   Cardiovascular:  Negative for chest pain, palpitations and leg swelling.   Gastrointestinal:  Positive for constipation and nausea. Negative for diarrhea.   Endocrine: Positive for cold intolerance. Negative for heat intolerance.   Neurological:  Negative for tremors and syncope.   Psychiatric/Behavioral:  Negative for sleep disturbance. The patient is not nervous/anxious.        Physical Exam:  Body mass index is 20.61 kg/m².  /62   Pulse 93   Ht 5' 7\" (1.702 m)   Wt 59.7 kg (131 lb 9.6 oz)   LMP 08/30/2024 (Exact Date)   SpO2 99%   BMI 20.61 kg/m²    [unfilled]     Physical Exam  Constitutional:       General: She is not in acute distress.     Appearance: She is not ill-appearing, toxic-appearing or diaphoretic.   HENT:      Head: Normocephalic.   Eyes:      Conjunctiva/sclera: Conjunctivae normal.      Comments: No proptosis or lid lag   Neck:      Comments: No thyromegaly or palpable nodules   Cardiovascular:      Rate and Rhythm: Normal rate.   Pulmonary:      Effort: Pulmonary effort is normal.   Musculoskeletal:         General: No swelling. Normal range of motion.      Cervical back: Neck supple.   Skin:     General: Skin is dry. " "  Neurological:      Mental Status: She is alert and oriented to person, place, and time. Mental status is at baseline.      Comments: Fine tremors present in the hands when outstretched   Psychiatric:         Mood and Affect: Mood normal.         Thought Content: Thought content normal.           Labs:    Latest Reference Range & Units Most Recent 03/07/22 07:16   TSH 3RD GENERATON 0.450 - 4.500 uIU/mL <0.010 (L)  11/22/24 12:37 1.084   FREE T4 0.61 - 1.12 ng/dL 1.62 (H)  11/22/24 12:37    THYROGLOBULIN AB 0.0 - 0.9 IU/mL 1.4 (H)  5/5/17 14:12    THYROID MICROSOMAL ANTIBODY 0 - 34 IU/mL 23  5/5/17 14:12       Latest Reference Range & Units Most Recent   THYROID STIMULATING IMMUNOGLOBULIN 0.00 - 0.55 IU/L <0.10  11/25/24 12:15     Lab Results   Component Value Date    CREATININE 0.55 (L) 11/08/2024    CREATININE 0.62 03/15/2024    CREATININE 0.66 11/14/2023    BUN 7 11/08/2024     10/09/2014    K 3.6 11/08/2024     11/08/2024    CO2 25 11/08/2024     eGFR   Date Value Ref Range Status   11/08/2024 122 ml/min/1.73sq m Final     No components found for: \"MALBCRER\"    Lab Results   Component Value Date    HDL 65 05/24/2024    TRIG 36 05/24/2024       Lab Results   Component Value Date    ALT 7 11/08/2024    AST 9 (L) 11/08/2024    ALKPHOS 31 (L) 11/08/2024    BILITOT 0.9 10/09/2014       Lab Results   Component Value Date    FREET4 1.62 (H) 11/22/2024       "

## 2024-12-03 ENCOUNTER — INITIAL PRENATAL (OUTPATIENT)
Dept: OBGYN CLINIC | Facility: CLINIC | Age: 34
End: 2024-12-03

## 2024-12-03 VITALS — WEIGHT: 131 LBS | BODY MASS INDEX: 20.52 KG/M2 | DIASTOLIC BLOOD PRESSURE: 66 MMHG | SYSTOLIC BLOOD PRESSURE: 114 MMHG

## 2024-12-03 DIAGNOSIS — Z36.9 ANTENATAL SCREENING ENCOUNTER: ICD-10-CM

## 2024-12-03 DIAGNOSIS — Z3A.14 14 WEEKS GESTATION OF PREGNANCY: Primary | ICD-10-CM

## 2024-12-03 DIAGNOSIS — E05.90 HYPERTHYROIDISM AFFECTING PREGNANCY IN SECOND TRIMESTER: ICD-10-CM

## 2024-12-03 DIAGNOSIS — O99.282 HYPERTHYROIDISM AFFECTING PREGNANCY IN SECOND TRIMESTER: ICD-10-CM

## 2024-12-03 PROCEDURE — 87591 N.GONORRHOEAE DNA AMP PROB: CPT | Performed by: PHYSICIAN ASSISTANT

## 2024-12-03 PROCEDURE — PNV: Performed by: PHYSICIAN ASSISTANT

## 2024-12-03 PROCEDURE — 87491 CHLMYD TRACH DNA AMP PROBE: CPT | Performed by: PHYSICIAN ASSISTANT

## 2024-12-03 NOTE — PROGRESS NOTES
34 y.o.  female at 14w4d (Estimated Date of Delivery: 25) for PNV.    Pre-Adriane Vitals      Flowsheet Row Most Recent Value   Prenatal Assessment    Prenatal Vitals    Blood Pressure 114/66   Weight - Scale 59.4 kg (131 lb)   Urine Albumin/Glucose    Dilation/Effacement/Station    Vaginal Drainage    Edema           TW.454 kg (1 lb)    Leakage of fluid: no  Vaginal bleeding: no  Contractions/Cramping: no  Fetal movement: no  Pt is feeling ok  Some nausea--improving  Has recd flu shot  Has seen MFM  and endo  Cultures done today    RTO in 4 weeks.

## 2024-12-04 LAB
C TRACH DNA SPEC QL NAA+PROBE: NEGATIVE
N GONORRHOEA DNA SPEC QL NAA+PROBE: NEGATIVE

## 2024-12-05 ENCOUNTER — RESULTS FOLLOW-UP (OUTPATIENT)
Dept: OBGYN CLINIC | Facility: CLINIC | Age: 34
End: 2024-12-05

## 2024-12-06 LAB
CFTR FULL MUT ANL BLD/T SEQ: ABNORMAL
CITATION REF LAB TEST: ABNORMAL
CLINICAL INFO: ABNORMAL
ETHNIC BACKGROUND STATED: ABNORMAL
GENE DIS ANL CARRIER INTERP-IMP: ABNORMAL
INDICATION: ABNORMAL
LAB DIRECTOR NAME PROVIDER: ABNORMAL
RECOMMENDATION PATIENT DOC-IMP: ABNORMAL
REF LAB TEST METHOD: ABNORMAL
SERVICE CMNT-IMP: ABNORMAL
SPECIMEN SOURCE: ABNORMAL

## 2024-12-09 ENCOUNTER — TELEPHONE (OUTPATIENT)
Dept: LABOR AND DELIVERY | Facility: HOSPITAL | Age: 34
End: 2024-12-09

## 2024-12-09 NOTE — TELEPHONE ENCOUNTER
Called patient to review CF carrier results.   She is a carrier for CF. Will need to have her partner tested.   Patient gave me her 's information.   Ross Janice- DOB05/31/1989. Will place order for CF testing for him.     Cleopatra Avalos MD  [facility]   10:12 AM  12/9/2024

## 2024-12-20 ENCOUNTER — TELEPHONE (OUTPATIENT)
Dept: OBGYN CLINIC | Facility: CLINIC | Age: 34
End: 2024-12-20

## 2024-12-20 NOTE — TELEPHONE ENCOUNTER
Overall how are you doing? Feeling Good, some nausea.     Compliant with routine OB care appointments? Yes   Have you completed your 1st trimester labs? YES-Patient states she is a CF carrier and awaiting authorization for  to be tested. Aware of TSH level lab order.    If you had NIPS with MFM, do you have a order for MSAFP? Per Dr Neumann message to Pat to discuss AFP testing with Patient.   Can be completed 15w-22w9d, ideally 16w-18w    Have you seen MFM and do you have your detailed US scheduled? Scheduled for 1/14/25    Pregnancy Education-have you had a chance to review the classes offered and registered? Not yet.     
normal...

## 2024-12-27 ENCOUNTER — APPOINTMENT (OUTPATIENT)
Dept: LAB | Facility: CLINIC | Age: 34
End: 2024-12-27
Payer: COMMERCIAL

## 2024-12-27 ENCOUNTER — TELEPHONE (OUTPATIENT)
Dept: RHEUMATOLOGY | Facility: CLINIC | Age: 34
End: 2024-12-27

## 2024-12-27 DIAGNOSIS — E05.90 HYPERTHYROIDISM AFFECTING PREGNANCY IN SECOND TRIMESTER: ICD-10-CM

## 2024-12-27 DIAGNOSIS — O99.282 HYPERTHYROIDISM AFFECTING PREGNANCY IN SECOND TRIMESTER: ICD-10-CM

## 2024-12-27 LAB
T4 FREE SERPL-MCNC: 0.74 NG/DL (ref 0.61–1.12)
TSH SERPL DL<=0.05 MIU/L-ACNC: 0.05 UIU/ML (ref 0.45–4.5)

## 2024-12-27 PROCEDURE — 84439 ASSAY OF FREE THYROXINE: CPT

## 2024-12-27 PROCEDURE — 36415 COLL VENOUS BLD VENIPUNCTURE: CPT

## 2024-12-27 PROCEDURE — 84443 ASSAY THYROID STIM HORMONE: CPT

## 2024-12-30 ENCOUNTER — TELEPHONE (OUTPATIENT)
Dept: OBGYN CLINIC | Facility: CLINIC | Age: 34
End: 2024-12-30

## 2024-12-30 NOTE — TELEPHONE ENCOUNTER
Spoke with the patient  and  he had blood work done today . Her   was not a patient of  [facility], so she was having a hard tome, he just set up the My chart, and when he gets the results. She will bring us a copy for her chart for the pregnancy

## 2024-12-31 ENCOUNTER — APPOINTMENT (OUTPATIENT)
Dept: LAB | Facility: CLINIC | Age: 34
End: 2024-12-31
Payer: COMMERCIAL

## 2024-12-31 ENCOUNTER — ROUTINE PRENATAL (OUTPATIENT)
Dept: OBGYN CLINIC | Facility: CLINIC | Age: 34
End: 2024-12-31

## 2024-12-31 VITALS — WEIGHT: 136 LBS | DIASTOLIC BLOOD PRESSURE: 64 MMHG | SYSTOLIC BLOOD PRESSURE: 110 MMHG | BODY MASS INDEX: 21.3 KG/M2

## 2024-12-31 DIAGNOSIS — Z3A.18 18 WEEKS GESTATION OF PREGNANCY: Primary | ICD-10-CM

## 2024-12-31 DIAGNOSIS — Z36.9 ANTENATAL SCREENING ENCOUNTER: ICD-10-CM

## 2024-12-31 DIAGNOSIS — Z3A.18 18 WEEKS GESTATION OF PREGNANCY: ICD-10-CM

## 2024-12-31 DIAGNOSIS — M05.79 SEROPOSITIVE RHEUMATOID ARTHRITIS OF MULTIPLE SITES (HCC): ICD-10-CM

## 2024-12-31 DIAGNOSIS — E05.90 HYPERTHYROIDISM AFFECTING PREGNANCY IN SECOND TRIMESTER: ICD-10-CM

## 2024-12-31 DIAGNOSIS — O99.282 HYPERTHYROIDISM AFFECTING PREGNANCY IN SECOND TRIMESTER: ICD-10-CM

## 2024-12-31 PROCEDURE — 82105 ALPHA-FETOPROTEIN SERUM: CPT

## 2024-12-31 PROCEDURE — PNV: Performed by: PHYSICIAN ASSISTANT

## 2024-12-31 PROCEDURE — 36415 COLL VENOUS BLD VENIPUNCTURE: CPT

## 2025-01-02 NOTE — PROGRESS NOTES
34 y.o.  female at 18w6d (Estimated Date of Delivery: 25) for PNV.    Pre-Adriane Vitals      Flowsheet Row Most Recent Value   Prenatal Assessment    Movement Absent   Prenatal Vitals    Blood Pressure 110/64   Weight - Scale 61.7 kg (136 lb)   Urine Albumin/Glucose    Dilation/Effacement/Station    Vaginal Drainage    Edema           TW.722 kg (6 lb)    Leakage of fluid: no  Vaginal bleeding: no  Contractions/Cramping: no  Fetal movement: yes  Pt is feeling well overall   Rx afp  +CF carrier--Mr went for his BW yesterday    RTO in 4 weeks.

## 2025-01-03 LAB
2ND TRIMESTER 4 SCREEN SERPL-IMP: NORMAL
AFP ADJ MOM SERPL: 0.88
AFP INTERP AMN-IMP: NORMAL
AFP INTERP SERPL-IMP: NORMAL
AFP INTERP SERPL-IMP: NORMAL
AFP SERPL-MCNC: 43.4 NG/ML
AGE AT DELIVERY: 35.2 YR
GA METHOD: NORMAL
GA: 18.6 WEEKS
IDDM PATIENT QL: NO
MULTIPLE PREGNANCY: NO
NEURAL TUBE DEFECT RISK FETUS: NORMAL %

## 2025-01-06 ENCOUNTER — RESULTS FOLLOW-UP (OUTPATIENT)
Dept: OBGYN CLINIC | Facility: CLINIC | Age: 35
End: 2025-01-06

## 2025-01-07 ENCOUNTER — RESULTS FOLLOW-UP (OUTPATIENT)
Dept: ENDOCRINOLOGY | Facility: CLINIC | Age: 35
End: 2025-01-07

## 2025-01-07 DIAGNOSIS — O99.282 HYPERTHYROIDISM AFFECTING PREGNANCY IN SECOND TRIMESTER: Primary | ICD-10-CM

## 2025-01-07 DIAGNOSIS — E05.90 HYPERTHYROIDISM AFFECTING PREGNANCY IN SECOND TRIMESTER: Primary | ICD-10-CM

## 2025-01-14 ENCOUNTER — ROUTINE PRENATAL (OUTPATIENT)
Facility: HOSPITAL | Age: 35
End: 2025-01-14
Payer: COMMERCIAL

## 2025-01-14 VITALS
BODY MASS INDEX: 21.69 KG/M2 | HEART RATE: 101 BPM | DIASTOLIC BLOOD PRESSURE: 66 MMHG | HEIGHT: 67 IN | SYSTOLIC BLOOD PRESSURE: 118 MMHG | WEIGHT: 138.2 LBS

## 2025-01-14 DIAGNOSIS — Z36.3 ENCOUNTER FOR ANTENATAL SCREENING FOR MALFORMATIONS: ICD-10-CM

## 2025-01-14 DIAGNOSIS — Z36.86 ENCOUNTER FOR ANTENATAL SCREENING FOR CERVICAL LENGTH: ICD-10-CM

## 2025-01-14 DIAGNOSIS — Z3A.20 20 WEEKS GESTATION OF PREGNANCY: Primary | ICD-10-CM

## 2025-01-14 DIAGNOSIS — O09.512 PRIMIGRAVIDA OF ADVANCED MATERNAL AGE IN SECOND TRIMESTER: ICD-10-CM

## 2025-01-14 PROCEDURE — 76817 TRANSVAGINAL US OBSTETRIC: CPT | Performed by: OBSTETRICS & GYNECOLOGY

## 2025-01-14 PROCEDURE — 76811 OB US DETAILED SNGL FETUS: CPT | Performed by: OBSTETRICS & GYNECOLOGY

## 2025-01-14 NOTE — PROGRESS NOTES
This patient received  care under my supervision at [facility].  The note is contained in the ultrasound report located under OB Procedures tab in Epic.  Please call our office at [phone] with questions.  -Arti Barbour MD

## 2025-01-14 NOTE — PROGRESS NOTES
Ultrasound Probe Disinfection    A transvaginal ultrasound was performed.   Prior to use, disinfection was performed with High Level Disinfection Process (HackerHANDon).  Probe serial number [id number]: [id number] was used.    Na Siddiqi  01/14/25  12:45 PM

## 2025-01-30 PROBLEM — Z3A.23 23 WEEKS GESTATION OF PREGNANCY: Status: ACTIVE | Noted: 2024-11-19

## 2025-01-31 ENCOUNTER — APPOINTMENT (OUTPATIENT)
Dept: LAB | Facility: CLINIC | Age: 35
End: 2025-01-31
Payer: COMMERCIAL

## 2025-01-31 ENCOUNTER — ROUTINE PRENATAL (OUTPATIENT)
Dept: OBGYN CLINIC | Facility: CLINIC | Age: 35
End: 2025-01-31

## 2025-01-31 VITALS — DIASTOLIC BLOOD PRESSURE: 58 MMHG | BODY MASS INDEX: 22.18 KG/M2 | WEIGHT: 141.6 LBS | SYSTOLIC BLOOD PRESSURE: 100 MMHG

## 2025-01-31 DIAGNOSIS — O09.512 PRIMIGRAVIDA OF ADVANCED MATERNAL AGE IN SECOND TRIMESTER: Primary | ICD-10-CM

## 2025-01-31 DIAGNOSIS — Z3A.23 23 WEEKS GESTATION OF PREGNANCY: ICD-10-CM

## 2025-01-31 DIAGNOSIS — O99.282 HYPERTHYROIDISM AFFECTING PREGNANCY IN SECOND TRIMESTER: ICD-10-CM

## 2025-01-31 DIAGNOSIS — E05.90 HYPERTHYROIDISM AFFECTING PREGNANCY IN SECOND TRIMESTER: ICD-10-CM

## 2025-01-31 LAB
T4 FREE SERPL-MCNC: 0.71 NG/DL (ref 0.61–1.12)
TSH SERPL DL<=0.05 MIU/L-ACNC: 0.39 UIU/ML (ref 0.45–4.5)

## 2025-01-31 PROCEDURE — 36415 COLL VENOUS BLD VENIPUNCTURE: CPT

## 2025-01-31 PROCEDURE — PNV: Performed by: OBSTETRICS & GYNECOLOGY

## 2025-01-31 PROCEDURE — 84443 ASSAY THYROID STIM HORMONE: CPT

## 2025-01-31 PROCEDURE — 84439 ASSAY OF FREE THYROXINE: CPT

## 2025-01-31 NOTE — PROGRESS NOTES
34 y.o.  female at 23w0d (Estimated Date of Delivery: 25) for PNV.    Pre-Adriane Vitals      Flowsheet Row Most Recent Value   Prenatal Assessment    Fetal Heart Rate 150   Movement Present   Prenatal Vitals    Blood Pressure 100/58   Weight - Scale 64.2 kg (141 lb 9.6 oz)   Urine Albumin/Glucose    Dilation/Effacement/Station    Vaginal Drainage    Edema           TW.262 kg (11 lb 9.6 oz)    Leakage of fluid: no  Vaginal bleeding: no  Contractions/Cramping: no  Fetal movement: yes    CF carried but FOB got tested and is negative  28 week labs ordered    RTO in 4 weeks.

## 2025-02-04 ENCOUNTER — RESULTS FOLLOW-UP (OUTPATIENT)
Dept: ENDOCRINOLOGY | Facility: CLINIC | Age: 35
End: 2025-02-04

## 2025-02-04 DIAGNOSIS — E05.90 HYPERTHYROIDISM AFFECTING PREGNANCY IN SECOND TRIMESTER: Primary | ICD-10-CM

## 2025-02-04 DIAGNOSIS — O99.282 HYPERTHYROIDISM AFFECTING PREGNANCY IN SECOND TRIMESTER: Primary | ICD-10-CM

## 2025-02-22 ENCOUNTER — CLINICAL SUPPORT (OUTPATIENT)
Dept: POSTPARTUM | Facility: CLINIC | Age: 35
End: 2025-02-22

## 2025-02-22 DIAGNOSIS — Z32.2 ENCOUNTER FOR CHILDBIRTH INSTRUCTION: Primary | ICD-10-CM

## 2025-02-25 ENCOUNTER — ROUTINE PRENATAL (OUTPATIENT)
Dept: OBGYN CLINIC | Facility: CLINIC | Age: 35
End: 2025-02-25

## 2025-02-25 VITALS — SYSTOLIC BLOOD PRESSURE: 100 MMHG | DIASTOLIC BLOOD PRESSURE: 62 MMHG | WEIGHT: 147 LBS | BODY MASS INDEX: 23.02 KG/M2

## 2025-02-25 DIAGNOSIS — O09.512 PRIMIGRAVIDA OF ADVANCED MATERNAL AGE IN SECOND TRIMESTER: Primary | ICD-10-CM

## 2025-02-25 DIAGNOSIS — Z3A.26 26 WEEKS GESTATION OF PREGNANCY: ICD-10-CM

## 2025-02-25 PROBLEM — D06.9 CIN III (CERVICAL INTRAEPITHELIAL NEOPLASIA GRADE III) WITH SEVERE DYSPLASIA: Status: RESOLVED | Noted: 2019-06-26 | Resolved: 2025-02-25

## 2025-02-25 PROCEDURE — PNV: Performed by: OBSTETRICS & GYNECOLOGY

## 2025-02-25 NOTE — PROGRESS NOTES
34 y.o.  female at 26w4d (Estimated Date of Delivery: 25) for PNV.      TW.262 kg (11 lb 9.6 oz)    Leakage of fluid: no  Vaginal bleeding: no  Contractions/Cramping: no  Fetal movement: yes    The patient has nausea on and off. No vomiting.     No headaches or dizziness.     Urine dip- protein neg, glucose neg.     Reminded pt to do 28 week labs    RTO in 4 weeks.

## 2025-03-04 ENCOUNTER — APPOINTMENT (OUTPATIENT)
Dept: LAB | Facility: CLINIC | Age: 35
End: 2025-03-04
Payer: COMMERCIAL

## 2025-03-04 ENCOUNTER — RESULTS FOLLOW-UP (OUTPATIENT)
Dept: RHEUMATOLOGY | Facility: CLINIC | Age: 35
End: 2025-03-04

## 2025-03-04 DIAGNOSIS — O99.282 HYPERTHYROIDISM AFFECTING PREGNANCY IN SECOND TRIMESTER: ICD-10-CM

## 2025-03-04 DIAGNOSIS — Z3A.23 23 WEEKS GESTATION OF PREGNANCY: ICD-10-CM

## 2025-03-04 DIAGNOSIS — E05.90 HYPERTHYROIDISM AFFECTING PREGNANCY IN SECOND TRIMESTER: ICD-10-CM

## 2025-03-04 DIAGNOSIS — Z79.899 ENCOUNTER FOR LONG-TERM (CURRENT) USE OF OTHER MEDICATIONS: ICD-10-CM

## 2025-03-04 DIAGNOSIS — M05.79 SEROPOSITIVE RHEUMATOID ARTHRITIS OF MULTIPLE SITES (HCC): ICD-10-CM

## 2025-03-04 LAB
ALBUMIN SERPL BCG-MCNC: 3.6 G/DL (ref 3.5–5)
ALP SERPL-CCNC: 66 U/L (ref 34–104)
ALT SERPL W P-5'-P-CCNC: 6 U/L (ref 7–52)
ANION GAP SERPL CALCULATED.3IONS-SCNC: 5 MMOL/L (ref 4–13)
AST SERPL W P-5'-P-CCNC: 10 U/L (ref 13–39)
BACTERIA UR QL AUTO: ABNORMAL /HPF
BASOPHILS # BLD AUTO: 0.03 THOUSANDS/ÂΜL (ref 0–0.1)
BASOPHILS NFR BLD AUTO: 0 % (ref 0–1)
BILIRUB SERPL-MCNC: 0.35 MG/DL (ref 0.2–1)
BILIRUB UR QL STRIP: NEGATIVE
BUN SERPL-MCNC: 7 MG/DL (ref 5–25)
CALCIUM SERPL-MCNC: 8.7 MG/DL (ref 8.4–10.2)
CHLORIDE SERPL-SCNC: 104 MMOL/L (ref 96–108)
CLARITY UR: ABNORMAL
CO2 SERPL-SCNC: 26 MMOL/L (ref 21–32)
COLOR UR: ABNORMAL
CREAT SERPL-MCNC: 0.46 MG/DL (ref 0.6–1.3)
CRP SERPL QL: 1.5 MG/L
EOSINOPHIL # BLD AUTO: 0.01 THOUSAND/ÂΜL (ref 0–0.61)
EOSINOPHIL NFR BLD AUTO: 0 % (ref 0–6)
ERYTHROCYTE [DISTWIDTH] IN BLOOD BY AUTOMATED COUNT: 13.1 % (ref 11.6–15.1)
ERYTHROCYTE [SEDIMENTATION RATE] IN BLOOD: 15 MM/HOUR (ref 0–19)
GFR SERPL CREATININE-BSD FRML MDRD: 130 ML/MIN/1.73SQ M
GLUCOSE 1H P 50 G GLC PO SERPL-MCNC: 96 MG/DL (ref 70–134)
GLUCOSE SERPL-MCNC: 93 MG/DL (ref 65–140)
GLUCOSE UR STRIP-MCNC: NEGATIVE MG/DL
HCT VFR BLD AUTO: 32.2 % (ref 34.8–46.1)
HGB BLD-MCNC: 10.8 G/DL (ref 11.5–15.4)
HGB UR QL STRIP.AUTO: NEGATIVE
IMM GRANULOCYTES # BLD AUTO: 0.03 THOUSAND/UL (ref 0–0.2)
IMM GRANULOCYTES NFR BLD AUTO: 0 % (ref 0–2)
KETONES UR STRIP-MCNC: ABNORMAL MG/DL
LEUKOCYTE ESTERASE UR QL STRIP: ABNORMAL
LYMPHOCYTES # BLD AUTO: 1.55 THOUSANDS/ÂΜL (ref 0.6–4.47)
LYMPHOCYTES NFR BLD AUTO: 19 % (ref 14–44)
MCH RBC QN AUTO: 32.5 PG (ref 26.8–34.3)
MCHC RBC AUTO-ENTMCNC: 33.5 G/DL (ref 31.4–37.4)
MCV RBC AUTO: 97 FL (ref 82–98)
MONOCYTES # BLD AUTO: 0.49 THOUSAND/ÂΜL (ref 0.17–1.22)
MONOCYTES NFR BLD AUTO: 6 % (ref 4–12)
NEUTROPHILS # BLD AUTO: 6.14 THOUSANDS/ÂΜL (ref 1.85–7.62)
NEUTS SEG NFR BLD AUTO: 75 % (ref 43–75)
NITRITE UR QL STRIP: NEGATIVE
NON-SQ EPI CELLS URNS QL MICRO: ABNORMAL /HPF
NRBC BLD AUTO-RTO: 0 /100 WBCS
PH UR STRIP.AUTO: 6.5 [PH]
PLATELET # BLD AUTO: 288 THOUSANDS/UL (ref 149–390)
PMV BLD AUTO: 10.1 FL (ref 8.9–12.7)
POTASSIUM SERPL-SCNC: 3.4 MMOL/L (ref 3.5–5.3)
PROT SERPL-MCNC: 6.5 G/DL (ref 6.4–8.4)
PROT UR STRIP-MCNC: NEGATIVE MG/DL
RBC # BLD AUTO: 3.32 MILLION/UL (ref 3.81–5.12)
RBC #/AREA URNS AUTO: ABNORMAL /HPF
SODIUM SERPL-SCNC: 135 MMOL/L (ref 135–147)
SP GR UR STRIP.AUTO: 1.01 (ref 1–1.03)
T4 FREE SERPL-MCNC: 0.64 NG/DL (ref 0.61–1.12)
TSH SERPL DL<=0.05 MIU/L-ACNC: 0.36 UIU/ML (ref 0.45–4.5)
UROBILINOGEN UR STRIP-ACNC: <2 MG/DL
WBC # BLD AUTO: 8.25 THOUSAND/UL (ref 4.31–10.16)
WBC #/AREA URNS AUTO: ABNORMAL /HPF

## 2025-03-04 PROCEDURE — 85652 RBC SED RATE AUTOMATED: CPT

## 2025-03-04 PROCEDURE — 85025 COMPLETE CBC W/AUTO DIFF WBC: CPT

## 2025-03-04 PROCEDURE — 36415 COLL VENOUS BLD VENIPUNCTURE: CPT

## 2025-03-04 PROCEDURE — 81001 URINALYSIS AUTO W/SCOPE: CPT

## 2025-03-04 PROCEDURE — 86140 C-REACTIVE PROTEIN: CPT

## 2025-03-04 PROCEDURE — 84439 ASSAY OF FREE THYROXINE: CPT

## 2025-03-04 PROCEDURE — 80053 COMPREHEN METABOLIC PANEL: CPT

## 2025-03-04 PROCEDURE — 82950 GLUCOSE TEST: CPT

## 2025-03-04 PROCEDURE — 86780 TREPONEMA PALLIDUM: CPT

## 2025-03-04 PROCEDURE — 84443 ASSAY THYROID STIM HORMONE: CPT

## 2025-03-05 ENCOUNTER — RESULTS FOLLOW-UP (OUTPATIENT)
Dept: LABOR AND DELIVERY | Facility: HOSPITAL | Age: 35
End: 2025-03-05

## 2025-03-05 LAB — TREPONEMA PALLIDUM IGG+IGM AB [PRESENCE] IN SERUM OR PLASMA BY IMMUNOASSAY: NORMAL

## 2025-03-06 ENCOUNTER — RESULTS FOLLOW-UP (OUTPATIENT)
Dept: ENDOCRINOLOGY | Facility: CLINIC | Age: 35
End: 2025-03-06

## 2025-03-10 NOTE — PROGRESS NOTES
Name: Lori Camacho      : 1990      MRN: [id number]  Encounter Provider: Wilma Johnson DO  Encounter Date: 3/13/2025   Encounter department: [facility] FOR DIABETES AND ENDOCRINOLOGY [place]  Chief Complaint: Follow up  :  Assessment & Plan  Hyperthyroidism affecting pregnancy in third trimester  TFTs continue to improve therefore will continue monitoring them every 4 weeks  RTC in 4 months for postpartum visit with Dr. Maldonado     Orders:    TSH, 3rd generation; Future    T4, free; Future      History of Present Illness     Lori Camacho is a 34 y.o. female with past medical history significant for elevated thyroglobulin antibodies, vitamin D deficiency, cervical dysplasia s/p cryotherapy, and rheumatoid arthritis who presents for a follow up visit for her abnormal TFTs during pregnancy.    She is currently 28 weeks pregnant. MIN is 25. She feels overall better compared to before and her nausea has improved. She is clinically euthyroid otherwise.    Recent labs:   3/4/25: TSH 0.364, free T4 0.64   25: TSH 0.387, free T4 0.71   24: TSH 0.046, free T4 0.74   24: TSI <0.1   24: TSH <0.010, free T4 1.62    Review of Systems as per HPI    Pertinent Medical History   Past Medical History:   Diagnosis Date    Abnormal Pap smear of cervix     Asthma     exercise induced    ANDREA III (cervical intraepithelial neoplasia grade III) with severe dysplasia 2019    Exercise-induced asthma     HPV (human papilloma virus) infection     Pregnancy 2024    RA (rheumatoid arthritis) (HCC)     Seasonal allergies        Current Outpatient Medications on File Prior to Visit   Medication Sig Dispense Refill    aspirin 81 mg chewable tablet Chew 2 tablets (162 mg total) daily Stop at 36 weeks gestation 60 tablet 5    CALCIUM PO Take 2,000 mg by mouth daily      Cholecalciferol (Vitamin D) 125 MCG (5000 UT) CAPS Take by mouth daily      Prenatal Vit-Fe Fumarate-FA  "(PRENATAL VITAMIN PO) Take 1 tablet by mouth in the morning      inFLIXimab (REMICADE) 100 mg Inject into a catheter in a vein Currently pregnant so not taking, will continue after pregnancy (Patient not taking: Reported on 2/25/2025)       Current Facility-Administered Medications on File Prior to Visit   Medication Dose Route Frequency Provider Last Rate Last Admin    ondansetron (ZOFRAN-ODT) dispersible tablet 4 mg  4 mg Oral Q6H PRN CHARY Abreu   4 mg at 01/06/23 1254      Social History     Tobacco Use    Smoking status: Never    Smokeless tobacco: Never   Vaping Use    Vaping status: Never Used   Substance and Sexual Activity    Alcohol use: Yes     Alcohol/week: 1.0 standard drink of alcohol     Types: 1 Glasses of wine per week     Comment: occasional    Drug use: No    Sexual activity: Yes     Partners: Male     Birth control/protection: Injection        Medical History Reviewed by provider this encounter:     .    Objective   /72   Pulse (!) 109   Ht 5' 7\" (1.702 m)   Wt 66.8 kg (147 lb 3.2 oz)   LMP 08/30/2024 (Exact Date)   SpO2 98%   BMI 23.05 kg/m²      Body mass index is 23.05 kg/m².    Wt Readings from Last 3 Encounters:   02/25/25 66.7 kg (147 lb)   01/31/25 64.2 kg (141 lb 9.6 oz)   01/14/25 62.7 kg (138 lb 3.2 oz)       Physical Exam  Vitals and nursing note reviewed.   Constitutional:       General: She is not in acute distress.     Appearance: She is well-developed.   HENT:      Head: Normocephalic and atraumatic.   Eyes:      Conjunctiva/sclera: Conjunctivae normal.      Right eye: Right conjunctiva is not injected.      Left eye: Left conjunctiva is not injected.      Comments: No proptosis or lid lag   Neck:      Thyroid: No thyromegaly or thyroid tenderness.   Cardiovascular:      Rate and Rhythm: Normal rate and regular rhythm.      Heart sounds: No murmur heard.  Pulmonary:      Effort: Pulmonary effort is normal. No respiratory distress.      Breath sounds: Normal " breath sounds.   Abdominal:      Palpations: Abdomen is soft.      Tenderness: There is no abdominal tenderness.   Musculoskeletal:         General: No swelling.      Cervical back: Neck supple.   Skin:     General: Skin is warm and dry.      Capillary Refill: Capillary refill takes less than 2 seconds.   Neurological:      Mental Status: She is alert.      Motor: No tremor.   Psychiatric:         Mood and Affect: Mood normal.       Labs:   Lab Results   Component Value Date    HGBA1C 5.3 05/24/2024    HGBA1C 4.8 07/21/2023    HGBA1C 5.2 07/22/2022     Lab Results   Component Value Date    CREATININE 0.46 (L) 03/04/2025    CREATININE 0.55 (L) 11/08/2024    CREATININE 0.62 03/15/2024    BUN 7 03/04/2025     10/09/2014    K 3.4 (L) 03/04/2025     03/04/2025    CO2 26 03/04/2025     eGFR   Date Value Ref Range Status   03/04/2025 130 ml/min/1.73sq m Final     Lab Results   Component Value Date    HDL 65 05/24/2024    TRIG 36 05/24/2024     Lab Results   Component Value Date    ALT 6 (L) 03/04/2025    AST 10 (L) 03/04/2025    ALKPHOS 66 03/04/2025    BILITOT 0.9 10/09/2014     Lab Results   Component Value Date    JTN4SHDHPONR 0.364 (L) 03/04/2025    XSE6YRUFRKBS 0.387 (L) 01/31/2025    DFE4GNRNQZEH 0.046 (L) 12/27/2024     Lab Results   Component Value Date    FREET4 0.64 03/04/2025     Discussed with the patient and all questioned fully answered. She will call me if any problems arise.

## 2025-03-13 ENCOUNTER — OFFICE VISIT (OUTPATIENT)
Dept: ENDOCRINOLOGY | Facility: CLINIC | Age: 35
End: 2025-03-13
Payer: COMMERCIAL

## 2025-03-13 VITALS
DIASTOLIC BLOOD PRESSURE: 72 MMHG | WEIGHT: 147.2 LBS | OXYGEN SATURATION: 98 % | HEIGHT: 67 IN | SYSTOLIC BLOOD PRESSURE: 112 MMHG | HEART RATE: 109 BPM | BODY MASS INDEX: 23.1 KG/M2

## 2025-03-13 DIAGNOSIS — O99.283 HYPERTHYROIDISM AFFECTING PREGNANCY IN THIRD TRIMESTER: Primary | ICD-10-CM

## 2025-03-13 DIAGNOSIS — E05.90 HYPERTHYROIDISM AFFECTING PREGNANCY IN THIRD TRIMESTER: Primary | ICD-10-CM

## 2025-03-13 PROCEDURE — 99214 OFFICE O/P EST MOD 30 MIN: CPT | Performed by: INTERNAL MEDICINE

## 2025-03-13 NOTE — ASSESSMENT & PLAN NOTE
TFTs continue to improve therefore will continue monitoring them every 4 weeks  RTC in 4 months for postpartum visit with Dr. Maldonado     Orders:    TSH, 3rd generation; Future    T4, free; Future

## 2025-03-14 ENCOUNTER — ROUTINE PRENATAL (OUTPATIENT)
Dept: OBGYN CLINIC | Facility: CLINIC | Age: 35
End: 2025-03-14
Payer: COMMERCIAL

## 2025-03-14 VITALS — SYSTOLIC BLOOD PRESSURE: 94 MMHG | DIASTOLIC BLOOD PRESSURE: 62 MMHG | WEIGHT: 147 LBS | BODY MASS INDEX: 23.02 KG/M2

## 2025-03-14 DIAGNOSIS — O09.523 AMA (ADVANCED MATERNAL AGE) MULTIGRAVIDA 35+, THIRD TRIMESTER: ICD-10-CM

## 2025-03-14 DIAGNOSIS — Z23 ENCOUNTER FOR IMMUNIZATION: ICD-10-CM

## 2025-03-14 DIAGNOSIS — O99.283 HYPERTHYROIDISM AFFECTING PREGNANCY IN THIRD TRIMESTER: Primary | ICD-10-CM

## 2025-03-14 DIAGNOSIS — E05.90 HYPERTHYROIDISM AFFECTING PREGNANCY IN THIRD TRIMESTER: Primary | ICD-10-CM

## 2025-03-14 DIAGNOSIS — Z3A.29 29 WEEKS GESTATION OF PREGNANCY: ICD-10-CM

## 2025-03-14 PROBLEM — O09.513 PRIMIGRAVIDA OF ADVANCED MATERNAL AGE IN THIRD TRIMESTER: Status: ACTIVE | Noted: 2025-01-14

## 2025-03-14 LAB
DME PARACHUTE DELIVERY DATE REQUESTED: NORMAL
DME PARACHUTE ITEM DESCRIPTION: NORMAL
DME PARACHUTE ORDER STATUS: NORMAL
DME PARACHUTE SUPPLIER NAME: NORMAL
DME PARACHUTE SUPPLIER PHONE: NORMAL

## 2025-03-14 PROCEDURE — PNV: Performed by: OBSTETRICS & GYNECOLOGY

## 2025-03-14 PROCEDURE — 90471 IMMUNIZATION ADMIN: CPT | Performed by: OBSTETRICS & GYNECOLOGY

## 2025-03-14 PROCEDURE — 90715 TDAP VACCINE 7 YRS/> IM: CPT | Performed by: OBSTETRICS & GYNECOLOGY

## 2025-03-14 NOTE — PROGRESS NOTES
34 y.o.  female at 29w0d (Estimated Date of Delivery: 25) for PNV.    Pre-Adriane Vitals      Flowsheet Row Most Recent Value   Prenatal Assessment    Fetal Heart Rate 150   Movement Present   Prenatal Vitals    Blood Pressure 94/62   Weight - Scale 66.7 kg (147 lb)   Urine Albumin/Glucose    Dilation/Effacement/Station    Vaginal Drainage    Edema           TW.711 kg (17 lb)    28 wk labs reviewed.    - GTT 96, Hgb 10.8, plts 288   - thyroid levels managed by endocrinologist. Monitoring levels q4 weeks. Not currently on medication.   Red folder given and reviewed. Discussed Fetal kick counts, PTL/Labor information, Baby & Me Classes.   Breast pump ordered  Patient requests Tdap  Urine neg/neg    Consent signed - ok with transfusion, full code.   Current visitor policy reviewed.   Patient plans to breastfeed.   Skin to skin, rooming in, delayed cord clamp, pain management in labor discussed.    TDAP was given.  Rhogam was not indicated.    Leakage of fluid: no  Vaginal bleeding: no  Contractions/Cramping: no  Fetal movement: yes      RTO in 2 weeks.

## 2025-03-14 NOTE — PATIENT INSTRUCTIONS
The Third Trimester  (28-42 weeks)  YOUR BABY   * your baby sucks its thumb now!   * your baby can hear voices and respond to touch…..so talk to him or her!!   * your baby’s brain grows and develops most in the last 2 months of pregnancy   * baby’s head and bones are soft and flexible so they can fit through the birth canal   * baby’s movements change towards the end of pregnancy because there is less room for kicking and stretching in your belly   * baby’s lungs are not fully developed and completely ready to breathe on their own until the last 3-4 weeks before your due date    YOUR BODY   * your belly is growing a lot now   * it may become more difficult to sleep well at night or to be as active as you usually are   * you may sweat more than usual   * you will become more off-balance……be careful not to fall!!   * you may develop hemorrhoids (which can be painful and make it difficult to sit down)   * the last two months of pregnancy can become very uncomfortable……with backaches, headaches, and heartburn   * you can start to have contractions…….as long as they are irregular and less than 5 per hour, this is a normal part of your body getting ready to have a baby   * your cervix may start to thin out and open up……to get ready for delivery   * you may find yourself needing to “pee” very often…….because baby is pressing on your bladder so much   * you may get out of breathe more quickly than usual      FETAL KICK COUNTS    In the third trimester (after 28 weeks gestation) you should be performing fetal kick counts every day.  Your baby should move at least 10 times in 2 hours during an active time, once a day.    Choose atime of day when your baby is most active.  Try to do this around the same time each day.  Get into a comfortable position and then write down the time your baby first moves.  Count each movement until the baby moves 10 times.  These movements include kicks, punches, nudges, flutters, or rolls.  This  can take anywhere from 5 minutes to 2 hours.  Write down the time you feel the baby's 10th movement.    If 2 hours has passed and your baby has not moved at least 10 times, you should CALL THE OFFICE RIGHT AWAY.  [phone]          PREMATURE LABOR     When to call [phone]:  * I need to call immediately if I have even a small amount of LIQUID leaking from my vagina, with or without contractions.   * I need to call if I am BLEEDING from my vagina.   * I need to call if I am feeling CRAMPING that continues after drinking 2-3 glasses of water and lying down on my side for one hour and that feels like I am having a period.   * I need to call if I feel CONTRACTIONS  more than 4 times in an hour that feels like the baby is “balling up” even after I try drinking 2-3 glasses of water and lying down on my side for an hour.   * I need to call if I notice a change in my vaginal DISCHARGE.   * I need to call if I am feeling PELVIC PRESSURE  that feels like the baby is pushing down into my vagina and lasts more than an hour.   * I need to call if I have LOW BACKACHE which is new and near my tailbone.  It may either come and go several times during an hour or stay there constantly.          PRE-ECLAMPSIA     What is it?   Pre-eclampsia is a serious disease that can occur during pregnancy related to high blood pressures.  It can happen to any woman.     Why should I care?   Women who develop pre-eclampsia have serious risks which can include seizures, stroke, organ damage, premature birth of their baby.  In the very worst cases, it can cause death of the mother and/or their baby.     What should I pay attention to?   Signs and symptoms of pre-eclampsia can include:   * Severe swelling of face or hands    * A headache that will not go away even after you have taken Tylenol   * Seeing spots or changes in eyesight    * Pain in the upper abdomen or shoulder    * New nausea and vomiting (in the second half of pregnancy)    *  Sudden weight gain    * Difficulty breathing     What should I do?   If you experience any of the above symptoms of pre-eclampsia, contact your OB provider.  Finding pre-eclampsia early is important for you and your baby.  Call us at [phone]      BREASTFEEDING     BENEFITS FOR BABIES   * stronger immune systems (less allergies, eczema, asthma, and childhood cancers)   * less diarrhea and constipation or other GI diseases   * fewer colds and ear infections   * better vision and teeth (fewer cavities)   * improves IQ   * lower rates of diabetes and obesity in childhood     BENEFITS FOR MOMS   * promotes faster weight loss after delivery   * lower risk for postpartum depression   * lower risk for breast, uterine, and ovarian cancers   * lower risk for osteoporosis developing with age   * easier than formula - is always right with you, clean, and the right temperature   * less expensive than formula……it’s FREE !!!!     KEYS TO SUCCESSFUL BREASTFEEDING   * keep baby skin-to-skin until after first feeding event   * keep baby in your room with you during your hospital stay after delivery   * avoid any bottle feedings (unless medically necessary)   * limit the use of pacifiers and swaddling   * ask for help if you are having any issues……lactation consultants (who specialize in breastfeeding) are available to help you   * a healthy diet for mom……eating a variety of foods and portions in moderation    THINGS YOU SHOULD KNOW ABOUT BREASTFEEDING   * most medications are considered compatible with breastfeeding by the American Academy of Pediatrics, but you should check with your health care provider or lactation consultant prior to taking a new medication……just to be sure it is safe   * alcohol (beer, wine, liquor) can be passed from mother to baby through breast milk……an occasional, social drink is deemed acceptable by the American Academy of Pediatrics…..more than that should be avoided   * breastfeeding is NOT an  effective method of birth control   * nicotine (in cigarettes) can pass from mother to baby through breast milk…..however, for mothers who smoke, it is still healthier to breastfeed than use formula   * caffeine should be limited to 1-3 cups per day……includes coffee, soda, energy drinks         PERINEAL / VAGINAL MASSAGE    What can I do now to decrease my chances of tearing during delivery?  Massaging around the vaginal opening by you (or your partner), either antepartum (before birth) or during the second stage of labor, can reduce the likelihood of perineal tearing during childbirth.  Likewise, the use of warm packs held on the perineum during the pushing stage of labor can reduce the severity of your tear.  This will happen during the pushing stage of labor.  At home, you can also help reduce the chances of injury that may occur during the birth of your child through perineal massage.    When should I do this?  Starting around or shortly after 34 weeks of pregnancy, you or your partner should provide 5-10 minutes of vaginal massage 1-4 times per week.    How?  Use either almond, coconut, or olive oil and water mixture on 1 or 2 fingers (depending on comfort).  Insert finger(s) 3-5cm into the vagina.  Apply sweeping downward/sideward pressure from 3 to 9 o'clock for 5-10 minutes, 1-4 times per week.          WARNING SIGNS DURING PREGNANCY  Call our office at [phone] if you experience any of the followin. Vaginal bleeding  2. Sharp abdominal pain that does not go away  3. Fever (more than 100.4 and is not relieved by Tylenol)  4. Persistent vomiting lasting greater than 24 hours  5. Chest pain   6. Pain or burning when you urinate  7. Severe headache that doesn't resolve with Tylenol  8. Blurred vision or seeing spots in your vision  9. Sudden swelling of your face or hands  10. Redness, swelling or pain in a leg  11. A sudden weight gain in just a few days  12. Decrease in your baby's movement (after  28 weeks or the 6th month of pregnancy)  13. A loss of watery fluid from your vagina - can be a gush, a trickle or continuous wetness  14. After 20 weeks of pregnancy, rhythmic cramping (greater than 4 per hour) or menstrual like low/pelvic pain          VACCINES IN PREGNANCY    TDAP  Whooping cough (or pertussis) can be serious for anyone, but for your , it can be life-threatning.  Up to 20 babies die each year in the [place] Due to whooping cough.  About half of babies younger than 1 year old who get whooping cough need treatment in the hospital.  The younger the baby is when he or she gets whooping cough, the more likely he or she will need to be treated in a hospital.  When you receive the whooping cough vaccine (Tdap) during your pregnancy, your body will create protective antibodies and pass some of them to your baby before birth.  These antibodies can help protect your baby from getting whooping cough until they are old enough to be vaccinated themselves (usually around 6 months of age).    INFLUENZA  Changes in your immune, heart, and lung functions during pregnancy make you more likely to get seriously ill from the flu.  Catching the flu also increases your chances for serious problems for your developing baby, including premature labor and delivery.  It is recommended that all women who are pregnant during flu season should receive an influenza vaccine.

## 2025-03-18 LAB
DME PARACHUTE DELIVERY DATE ACTUAL: NORMAL
DME PARACHUTE DELIVERY DATE REQUESTED: NORMAL
DME PARACHUTE ITEM DESCRIPTION: NORMAL
DME PARACHUTE ORDER STATUS: NORMAL
DME PARACHUTE SUPPLIER NAME: NORMAL
DME PARACHUTE SUPPLIER PHONE: NORMAL

## 2025-03-25 ENCOUNTER — OFFICE VISIT (OUTPATIENT)
Age: 35
End: 2025-03-25
Payer: COMMERCIAL

## 2025-03-25 VITALS
BODY MASS INDEX: 23.7 KG/M2 | OXYGEN SATURATION: 100 % | SYSTOLIC BLOOD PRESSURE: 116 MMHG | WEIGHT: 151 LBS | HEIGHT: 67 IN | DIASTOLIC BLOOD PRESSURE: 60 MMHG | HEART RATE: 117 BPM

## 2025-03-25 DIAGNOSIS — M05.79 SEROPOSITIVE RHEUMATOID ARTHRITIS OF MULTIPLE SITES (HCC): Primary | ICD-10-CM

## 2025-03-25 PROCEDURE — 99214 OFFICE O/P EST MOD 30 MIN: CPT | Performed by: PHYSICIAN ASSISTANT

## 2025-03-25 NOTE — PROGRESS NOTES
Name: Lori Camacho      : 1990      MRN: [id number]  Encounter Provider: Lori Sethi PA-C  Encounter Date: 3/25/2025   Encounter department: [facility] RHEUMATOLOGY [facility]  :  Assessment & Plan  Seropositive rheumatoid arthritis of multiple sites (HCC)  She is feeling well and her rheumatoid arthritis symptoms are currently quiescent at this time during her pregnancy.  She is due at the end of May.  Will plan to remain off of medication.  We did discuss that it is possible she has a flare of symptoms postpartum however she will be in touch if she has any flareups or issues.  Otherwise we will plan to monitor symptoms closely.  We did discuss treatment options including switching to Cimzia if needed postpartum if she has breast-feeding.  Labs as ordered to monitor for disease activity.  Plan follow-up in 6 months or sooner if needed.    Orders:    CBC and differential; Future    Comprehensive metabolic panel; Future    Sedimentation rate, automated; Future    C-reactive protein; Future    Quantiferon TB Gold Plus Assay; Future        History of Present Illness   Lori Camacho is a 35 y.o. female.  She is here for follow-up of her seropositive rheumatoid arthritis.  She has been on Remicade since .  She is currently 30 weeks pregnant (due at the end of May 2025) and has stopped her Remicade infusions.  Her last Remicade infusion was done on 2024.  She has been feeling well in regards to her RA and has not had any joint pain or swelling.       She has a history of positive thyroid antibodies.  She has had a decreased TSH during pregnancy and is following with endocrinology.  She has a history of exercise-induced asthma as well.  This has been stable.    She had labs done on 3/4/2025.  H/H 10.8/32.2.  Potassium 3.4.  ESR, CRP within normal limits.              Review of Systems   Constitutional:  Negative for chills, fatigue and fever.   HENT:  Negative for  "hearing loss, sore throat and tinnitus.    Eyes:  Negative for pain and visual disturbance.   Respiratory:  Negative for cough and shortness of breath.    Cardiovascular:  Negative for chest pain and palpitations.   Gastrointestinal:  Negative for abdominal pain, nausea and vomiting.   Genitourinary:  Negative for difficulty urinating.   Musculoskeletal:  Negative for arthralgias, back pain, gait problem, joint swelling, myalgias, neck pain and neck stiffness.   Skin:  Negative for rash.   Neurological:  Negative for dizziness, seizures, weakness, numbness and headaches.   Psychiatric/Behavioral:  Negative for confusion, decreased concentration and sleep disturbance.      Current Outpatient Medications on File Prior to Visit   Medication Sig Dispense Refill    aspirin 81 mg chewable tablet Chew 2 tablets (162 mg total) daily Stop at 36 weeks gestation 60 tablet 5    CALCIUM PO Take 2,000 mg by mouth daily      Cholecalciferol (Vitamin D) 125 MCG (5000 UT) CAPS Take by mouth daily      Prenatal Vit-Fe Fumarate-FA (PRENATAL VITAMIN PO) Take 1 tablet by mouth in the morning      inFLIXimab (REMICADE) 100 mg Inject into a catheter in a vein Currently pregnant so not taking, will continue after pregnancy (Patient not taking: Reported on 2/25/2025)       Current Facility-Administered Medications on File Prior to Visit   Medication Dose Route Frequency Provider Last Rate Last Admin    ondansetron (ZOFRAN-ODT) dispersible tablet 4 mg  4 mg Oral Q6H PRN CHARY Abreu   4 mg at 01/06/23 1254         Objective   /60   Pulse (!) 117   Ht 5' 7\" (1.702 m)   Wt 68.5 kg (151 lb)   LMP 08/30/2024 (Exact Date)   SpO2 100%   BMI 23.65 kg/m²      Physical Exam  Constitutional:       Appearance: Normal appearance.   Cardiovascular:      Rate and Rhythm: Normal rate and regular rhythm.   Pulmonary:      Breath sounds: Normal breath sounds.   Musculoskeletal:         General: No swelling or tenderness.      " Comments: : Full range of motion neck, bilateral shoulders, elbows, wrists.  No synovitis noted in hands.  Full range of motion bilateral hips, knees, ankles.  No synovitis noted in feet.   Skin:     General: Skin is warm and dry.   Neurological:      General: No focal deficit present.      Mental Status: She is alert.             Dragon Dictation software was used to dictate this note. It may contain errors with dictating incorrect words/spelling. Please contact provider directly for any questions.

## 2025-03-25 NOTE — ASSESSMENT & PLAN NOTE
She is feeling well and her rheumatoid arthritis symptoms are currently quiescent at this time during her pregnancy.  She is due at the end of May.  Will plan to remain off of medication.  We did discuss that it is possible she has a flare of symptoms postpartum however she will be in touch if she has any flareups or issues.  Otherwise we will plan to monitor symptoms closely.  We did discuss treatment options including switching to Cimzia if needed postpartum if she has breast-feeding.  Labs as ordered to monitor for disease activity.  Plan follow-up in 6 months or sooner if needed.    Orders:    CBC and differential; Future    Comprehensive metabolic panel; Future    Sedimentation rate, automated; Future    C-reactive protein; Future    Quantiferon TB Gold Plus Assay; Future

## 2025-03-28 ENCOUNTER — ROUTINE PRENATAL (OUTPATIENT)
Dept: OBGYN CLINIC | Facility: CLINIC | Age: 35
End: 2025-03-28

## 2025-03-28 ENCOUNTER — TELEPHONE (OUTPATIENT)
Dept: OBGYN CLINIC | Facility: CLINIC | Age: 35
End: 2025-03-28

## 2025-03-28 VITALS — SYSTOLIC BLOOD PRESSURE: 116 MMHG | DIASTOLIC BLOOD PRESSURE: 66 MMHG | BODY MASS INDEX: 23.34 KG/M2 | WEIGHT: 149 LBS

## 2025-03-28 DIAGNOSIS — M54.9 BACK PAIN IN PREGNANCY: ICD-10-CM

## 2025-03-28 DIAGNOSIS — O99.891 BACK PAIN IN PREGNANCY: ICD-10-CM

## 2025-03-28 DIAGNOSIS — Z3A.31 31 WEEKS GESTATION OF PREGNANCY: ICD-10-CM

## 2025-03-28 DIAGNOSIS — O09.523 AMA (ADVANCED MATERNAL AGE) MULTIGRAVIDA 35+, THIRD TRIMESTER: Primary | ICD-10-CM

## 2025-03-28 PROCEDURE — PNV: Performed by: OBSTETRICS & GYNECOLOGY

## 2025-03-28 RX ORDER — CYCLOBENZAPRINE HCL 5 MG
5 TABLET ORAL 3 TIMES DAILY PRN
Qty: 30 TABLET | Refills: 0 | Status: SHIPPED | OUTPATIENT
Start: 2025-03-28 | End: 2025-04-18

## 2025-03-28 NOTE — TELEPHONE ENCOUNTER
Patient returned call but got through to the [facility] access center. Patient states she received a message however the call back number was incorrect.  Patient states she will contact [facility] to try and reach OB Nurse for follow up.

## 2025-03-28 NOTE — TELEPHONE ENCOUNTER
Patient was called for 3rd  trimester follow up.  Left a voicemail to call the office with any questions, concerns or symptoms. Patient was reminded that she can message this nurse via portal as well.

## 2025-03-28 NOTE — PROGRESS NOTES
35 y.o.  female at 31w0d (Estimated Date of Delivery: 25) for PNV.    Pre-Adriane Vitals      Flowsheet Row Most Recent Value   Prenatal Assessment    Fetal Heart Rate 145   Movement Present   Prenatal Vitals    Blood Pressure 116/66   Weight - Scale 67.6 kg (149 lb)   Urine Albumin/Glucose    Dilation/Effacement/Station    Vaginal Drainage    Edema           TW.618 kg (19 lb)    Leakage of fluid: no  Vaginal bleeding: no  Contractions/Cramping: no  Fetal movement: yes    Having pinpoint back pain.   Has been trying massage.   Not exacerbated by anything.   Gets better with laying down.   Flexeril sent. Rec trying lido patch as well.     RTO in 2 weeks.

## 2025-03-31 PROBLEM — Z3A.32 32 WEEKS GESTATION OF PREGNANCY: Status: ACTIVE | Noted: 2025-03-31

## 2025-04-04 ENCOUNTER — TELEPHONE (OUTPATIENT)
Dept: OBGYN CLINIC | Facility: CLINIC | Age: 35
End: 2025-04-04

## 2025-04-04 NOTE — TELEPHONE ENCOUNTER
Patient was called and discussed the following:  Overall how are you feeling? Good    Compliant with routine OB appointments? YES    Have you completed your 3rd trimester lab work? YES, Patient aware other labs ordered from other specialty.    Have you reviewed the contents of 3rd trimester folder from office?    Have you decided on a pediatrician? Not Yet    If yes, who Lives in [place] looking for PEDS in [facility]. Will call one of the offices.        Questions on paperwork to go back to office? No   Questions on the baby birth certificate and photography forms? No    Will send Hospital readiness video.

## 2025-04-08 ENCOUNTER — ULTRASOUND (OUTPATIENT)
Facility: HOSPITAL | Age: 35
End: 2025-04-08
Payer: COMMERCIAL

## 2025-04-08 ENCOUNTER — APPOINTMENT (OUTPATIENT)
Dept: LAB | Facility: CLINIC | Age: 35
End: 2025-04-08
Payer: COMMERCIAL

## 2025-04-08 VITALS
SYSTOLIC BLOOD PRESSURE: 112 MMHG | OXYGEN SATURATION: 100 % | HEART RATE: 105 BPM | DIASTOLIC BLOOD PRESSURE: 60 MMHG | WEIGHT: 151.46 LBS | BODY MASS INDEX: 23.77 KG/M2 | HEIGHT: 67 IN

## 2025-04-08 DIAGNOSIS — O09.513 PRIMIGRAVIDA OF ADVANCED MATERNAL AGE IN THIRD TRIMESTER: ICD-10-CM

## 2025-04-08 DIAGNOSIS — E05.90 HYPERTHYROIDISM AFFECTING PREGNANCY IN THIRD TRIMESTER: Primary | ICD-10-CM

## 2025-04-08 DIAGNOSIS — O99.283 HYPERTHYROIDISM AFFECTING PREGNANCY IN THIRD TRIMESTER: Primary | ICD-10-CM

## 2025-04-08 DIAGNOSIS — Z3A.32 32 WEEKS GESTATION OF PREGNANCY: ICD-10-CM

## 2025-04-08 DIAGNOSIS — M05.79 SEROPOSITIVE RHEUMATOID ARTHRITIS OF MULTIPLE SITES (HCC): ICD-10-CM

## 2025-04-08 DIAGNOSIS — Z36.89 ENCOUNTER FOR ULTRASOUND TO ASSESS FETAL GROWTH: ICD-10-CM

## 2025-04-08 DIAGNOSIS — E05.90 HYPERTHYROIDISM AFFECTING PREGNANCY IN THIRD TRIMESTER: ICD-10-CM

## 2025-04-08 DIAGNOSIS — O99.283 HYPERTHYROIDISM AFFECTING PREGNANCY IN THIRD TRIMESTER: ICD-10-CM

## 2025-04-08 LAB — TSH SERPL DL<=0.05 MIU/L-ACNC: 0.6 UIU/ML (ref 0.45–4.5)

## 2025-04-08 PROCEDURE — 99213 OFFICE O/P EST LOW 20 MIN: CPT | Performed by: STUDENT IN AN ORGANIZED HEALTH CARE EDUCATION/TRAINING PROGRAM

## 2025-04-08 PROCEDURE — 84443 ASSAY THYROID STIM HORMONE: CPT

## 2025-04-08 PROCEDURE — 76816 OB US FOLLOW-UP PER FETUS: CPT | Performed by: STUDENT IN AN ORGANIZED HEALTH CARE EDUCATION/TRAINING PROGRAM

## 2025-04-08 PROCEDURE — 36415 COLL VENOUS BLD VENIPUNCTURE: CPT

## 2025-04-08 PROCEDURE — 84439 ASSAY OF FREE THYROXINE: CPT

## 2025-04-08 NOTE — PROGRESS NOTES
"[facility]: Ms. Camacho was seen today for fetal growth assessment ultrasound.  See ultrasound report under \"OB Procedures\" tab.      MDM:   I. Diagnoses/Problems addressed:  Minimal  II.  Data: Moderate  III.  Risk of morbidity: Minimal    Please don't hesitate to contact our office with any concerns or questions.  -Heather Reed MD  "

## 2025-04-09 ENCOUNTER — RESULTS FOLLOW-UP (OUTPATIENT)
Dept: ENDOCRINOLOGY | Facility: CLINIC | Age: 35
End: 2025-04-09

## 2025-04-09 DIAGNOSIS — O99.283 HYPERTHYROIDISM AFFECTING PREGNANCY IN THIRD TRIMESTER: Primary | ICD-10-CM

## 2025-04-09 DIAGNOSIS — E05.90 HYPERTHYROIDISM AFFECTING PREGNANCY IN THIRD TRIMESTER: Primary | ICD-10-CM

## 2025-04-09 LAB — T4 FREE SERPL-MCNC: 0.69 NG/DL (ref 0.61–1.12)

## 2025-04-10 ENCOUNTER — ROUTINE PRENATAL (OUTPATIENT)
Dept: OBGYN CLINIC | Facility: CLINIC | Age: 35
End: 2025-04-10

## 2025-04-10 VITALS — DIASTOLIC BLOOD PRESSURE: 68 MMHG | SYSTOLIC BLOOD PRESSURE: 112 MMHG | WEIGHT: 153 LBS | BODY MASS INDEX: 23.96 KG/M2

## 2025-04-10 DIAGNOSIS — O09.523 AMA (ADVANCED MATERNAL AGE) MULTIGRAVIDA 35+, THIRD TRIMESTER: ICD-10-CM

## 2025-04-10 DIAGNOSIS — O99.283 HYPERTHYROIDISM AFFECTING PREGNANCY IN THIRD TRIMESTER: Primary | ICD-10-CM

## 2025-04-10 DIAGNOSIS — E05.90 HYPERTHYROIDISM AFFECTING PREGNANCY IN THIRD TRIMESTER: Primary | ICD-10-CM

## 2025-04-10 DIAGNOSIS — Z3A.32 32 WEEKS GESTATION OF PREGNANCY: ICD-10-CM

## 2025-04-10 PROBLEM — Z3A.29 29 WEEKS GESTATION OF PREGNANCY: Status: RESOLVED | Noted: 2024-11-19 | Resolved: 2025-04-10

## 2025-04-10 PROCEDURE — PNV: Performed by: OBSTETRICS & GYNECOLOGY

## 2025-04-10 NOTE — PROGRESS NOTES
35 y.o.  female at 32w6d (Estimated Date of Delivery: 25) for PNV.    Pre-Adriane Vitals      Flowsheet Row Most Recent Value   Prenatal Assessment    Fetal Heart Rate 140   Fundal Height (cm) 32 cm   Movement Present   Prenatal Vitals    Blood Pressure 112/68   Weight - Scale 69.4 kg (153 lb)   Urine Albumin/Glucose    Dilation/Effacement/Station    Vaginal Drainage    Edema           TWG: 10.4 kg (23 lb)    Leakage of fluid: no  Vaginal bleeding: no  Contractions/Cramping: no  Fetal movement: yes    RTO in 2 weeks.

## 2025-04-21 ENCOUNTER — CLINICAL SUPPORT (OUTPATIENT)
Age: 35
End: 2025-04-21

## 2025-04-21 DIAGNOSIS — Z32.2 ENCOUNTER FOR CHILDBIRTH INSTRUCTION: Primary | ICD-10-CM

## 2025-04-22 ENCOUNTER — ROUTINE PRENATAL (OUTPATIENT)
Dept: OBGYN CLINIC | Facility: CLINIC | Age: 35
End: 2025-04-22

## 2025-04-22 VITALS — BODY MASS INDEX: 24.59 KG/M2 | DIASTOLIC BLOOD PRESSURE: 60 MMHG | SYSTOLIC BLOOD PRESSURE: 118 MMHG | WEIGHT: 157 LBS

## 2025-04-22 DIAGNOSIS — O99.283 HYPERTHYROIDISM AFFECTING PREGNANCY IN THIRD TRIMESTER: ICD-10-CM

## 2025-04-22 DIAGNOSIS — E05.90 HYPERTHYROIDISM AFFECTING PREGNANCY IN THIRD TRIMESTER: ICD-10-CM

## 2025-04-22 DIAGNOSIS — Z3A.34 34 WEEKS GESTATION OF PREGNANCY: ICD-10-CM

## 2025-04-22 DIAGNOSIS — O09.523 AMA (ADVANCED MATERNAL AGE) MULTIGRAVIDA 35+, THIRD TRIMESTER: Primary | ICD-10-CM

## 2025-04-22 PROCEDURE — PNV

## 2025-04-22 NOTE — PROGRESS NOTES
Patient is a 34 YO  female presenting to the office at 34w4d for routine OB care.   Patient is feeling well today.   Rheumatoid arthritis, following with rheumatology  Following with endocrinology for thyroid levels due to hx of hyperthyroidism, not currently on medications.   Fetal heart rate: 150  BP: 118/60  TWlb  Fetal Movement: yes, good movement   LOF: no  VB: no  CTX: no  Reviewed precautions  Call for concerns  RTO 2 weeks

## 2025-04-23 ENCOUNTER — CLINICAL SUPPORT (OUTPATIENT)
Age: 35
End: 2025-04-23

## 2025-04-23 DIAGNOSIS — Z32.2 ENCOUNTER FOR CHILDBIRTH INSTRUCTION: Primary | ICD-10-CM

## 2025-05-09 ENCOUNTER — ROUTINE PRENATAL (OUTPATIENT)
Dept: OBGYN CLINIC | Facility: CLINIC | Age: 35
End: 2025-05-09

## 2025-05-09 ENCOUNTER — APPOINTMENT (OUTPATIENT)
Dept: LAB | Facility: CLINIC | Age: 35
End: 2025-05-09
Payer: COMMERCIAL

## 2025-05-09 ENCOUNTER — APPOINTMENT (OUTPATIENT)
Dept: LAB | Facility: CLINIC | Age: 35
End: 2025-05-09
Attending: PREVENTIVE MEDICINE
Payer: COMMERCIAL

## 2025-05-09 VITALS — SYSTOLIC BLOOD PRESSURE: 98 MMHG | DIASTOLIC BLOOD PRESSURE: 66 MMHG | BODY MASS INDEX: 25.34 KG/M2 | WEIGHT: 161.8 LBS

## 2025-05-09 DIAGNOSIS — O99.283 HYPERTHYROIDISM AFFECTING PREGNANCY IN THIRD TRIMESTER: ICD-10-CM

## 2025-05-09 DIAGNOSIS — Z00.8 HEALTH EXAMINATION IN POPULATION SURVEY: ICD-10-CM

## 2025-05-09 DIAGNOSIS — E05.90 HYPERTHYROIDISM AFFECTING PREGNANCY IN THIRD TRIMESTER: ICD-10-CM

## 2025-05-09 DIAGNOSIS — Z3A.37 37 WEEKS GESTATION OF PREGNANCY: ICD-10-CM

## 2025-05-09 DIAGNOSIS — O09.523 AMA (ADVANCED MATERNAL AGE) MULTIGRAVIDA 35+, THIRD TRIMESTER: Primary | ICD-10-CM

## 2025-05-09 LAB
CHOLEST SERPL-MCNC: 249 MG/DL (ref ?–200)
EST. AVERAGE GLUCOSE BLD GHB EST-MCNC: 108 MG/DL
HBA1C MFR BLD: 5.4 %
HDLC SERPL-MCNC: 96 MG/DL
LDLC SERPL CALC-MCNC: 121 MG/DL (ref 0–100)
NONHDLC SERPL-MCNC: 153 MG/DL
T4 FREE SERPL-MCNC: 0.59 NG/DL (ref 0.61–1.12)
TRIGL SERPL-MCNC: 162 MG/DL (ref ?–150)
TSH SERPL DL<=0.05 MIU/L-ACNC: 1.19 UIU/ML (ref 0.45–4.5)

## 2025-05-09 PROCEDURE — 87591 N.GONORRHOEAE DNA AMP PROB: CPT | Performed by: OBSTETRICS & GYNECOLOGY

## 2025-05-09 PROCEDURE — 87491 CHLMYD TRACH DNA AMP PROBE: CPT | Performed by: OBSTETRICS & GYNECOLOGY

## 2025-05-09 PROCEDURE — PNV: Performed by: OBSTETRICS & GYNECOLOGY

## 2025-05-09 PROCEDURE — 83036 HEMOGLOBIN GLYCOSYLATED A1C: CPT

## 2025-05-09 PROCEDURE — 36415 COLL VENOUS BLD VENIPUNCTURE: CPT

## 2025-05-09 PROCEDURE — 80061 LIPID PANEL: CPT

## 2025-05-09 PROCEDURE — 87150 DNA/RNA AMPLIFIED PROBE: CPT | Performed by: OBSTETRICS & GYNECOLOGY

## 2025-05-09 PROCEDURE — 84439 ASSAY OF FREE THYROXINE: CPT

## 2025-05-09 PROCEDURE — 84443 ASSAY THYROID STIM HORMONE: CPT

## 2025-05-09 NOTE — PATIENT INSTRUCTIONS
Am I in labour?    As you enter the final stages of your pregnancy, your body will give signs that labour is approaching. The following information should help you to understand these signs and make it easier for you to determine whether you are in labour.    Some of the signs and symptoms of going into labour may include:    period-like cramps  backache  diarrhoea  mucous discharge or ‘show’  gush or trickle of water as the membranes break  contractions  Engagement  As you move closer to delivery, your baby’s head may drop and become engaged in your pelvis in preparation for labour. If you are expecting your first baby, you may notice pressure in your groin and on your bladder beginning up to four weeks before the birth. You may also notice that you can breathe a little easier and have a little more appetite as your baby drops, and is not pushed up against your diaphragm and stomach quite so much. This is sometimes known as “lightening”, as women generally feel lighter.    Show    During your pregnancy a mucous plug fills your cervix. Towards the end of pregnancy, the cervix becomes softer and this mucous plug may become loosened and start to come away. The process of discharging this mucous is called a ‘show’ and might often contain streaks of blood or may also be brownish in colour. This is different from any flow of fresh blood which you would report immediately to your doctor or the hospital. The show may continue over a period of hours or even days. It is one of the signs that your body is preparing for birth. Labour may begin in the next few days, hours or weeks following a show. There is no need to phone the hospital if you have had a show.    Water breaking (rupture of membranes)    This may occur at any time prior to the start of labour, or at any time during labour. The break may be low, near the opening of the uterus, and will produce a gush of amniotic fluid. If this occurs, place a sanitary pad on and  "note the colour of the fluid. Ring the hospital and tell the midwife that this has occurred. You will usually be asked to come in to the hospital.    Another type of amniotic fluid leak may occur higher up in the amniotic sack, or top of the uterus. This will be less obvious to you and you may only notice a trickle of fluid. Since many women have a heavy vaginal discharge or leak a small amount of urine towards the end of their pregnancy and it is often difficult to tell the difference between urine and amniotic fluid - urine is often yellow; where amniotic fluid is usually clear, or has a pink tinge, and has a \"sweet\" odour. If you are unsure, ring the hospital.    If the colour of the fluid is green or brownish, it indicates that your baby has passed a bowel motion (meconium) inside the uterus. It is very common to have meconium-stained amniotic fluid in a pregnancy over 41 weeks, but this may also be a sign that your baby is distressed. You will need to ring the hospital immediately and then come into the hospital.    Contractions    [place] contractions  [place] contractions are sometimes mistaken for labour. These “practice” contractions usually start assisted through the pregnancy and continue right through to the end. These contractions are often irregular and can be uncomfortable and tight. [place] contractions usually increase in regularity and strength towards the end of your pregnancy, preparing your uterus for the birth. Sometimes it is difficult to distinguish between these [place] contractions and labour contractions. Below are the common differences between the two.    Labour contractions  True labour contractions usually increase in strength and duration. In order to time your contractions, time the interval between the start of one contraction to the start of the next. Early labour contractions are often likened to period cramps with or without backache.    [place] Miller " contractions    Labour contractions    usually irregular and short    become regular with time    do not get closer together    get closer together with time    do not get stronger     become stronger    walking does not make them stronger  walking can make them stronger    lying down may make them go away   lying down does not make them go away    uncomfortable and tight--not painful   Painful - can't walk, talk or breathe through them        How does labour start?    Labour can start in different ways. You may be start experiencing some period like pains or contractions. You might notice that these tightenings/contractions start to get stronger, closer and last longer than before. Or you might start with some back ache or a stomach upset that gets stronger and develops into regular contractions. In approximately 10-15% of women, labour will start when your membranes rupture. Contractions usually follow.    Should I call my doctor?    You should call your doctor at [phone] when:    - your patrick break  - you have bright blood loss  - your contractions are regular and five minutes apart  - if you have decreased fetal movement

## 2025-05-09 NOTE — PROGRESS NOTES
35 y.o.  female at 37w0d (Estimated Date of Delivery: 25) for PNV.    Pre-Adriane Vitals      Flowsheet Row Most Recent Value   Prenatal Assessment    Fetal Heart Rate 150   Movement Present   Prenatal Vitals    Blood Pressure 98/66   Weight - Scale 73.4 kg (161 lb 12.8 oz)   Urine Albumin/Glucose    Dilation/Effacement/Station    Vaginal Drainage    Edema           TW.4 kg (31 lb 12.8 oz)    Leakage of fluid: no  Vaginal bleeding: no  Contractions/Cramping: no  Fetal movement: yes    GBS done: yes  PCN allergy: Yes  Chlamydia/gonorrhea swab done: yes  Delivery plan: await labor, discussed IOL after EDC    Labor precautions given.  FKC reviewed.     RTO in 1 weeks.

## 2025-05-10 LAB
C TRACH DNA SPEC QL NAA+PROBE: NEGATIVE
N GONORRHOEA DNA SPEC QL NAA+PROBE: NEGATIVE

## 2025-05-11 LAB — GP B STREP DNA SPEC QL NAA+PROBE: NEGATIVE

## 2025-05-12 ENCOUNTER — RESULTS FOLLOW-UP (OUTPATIENT)
Dept: ENDOCRINOLOGY | Facility: CLINIC | Age: 35
End: 2025-05-12

## 2025-05-12 ENCOUNTER — RESULTS FOLLOW-UP (OUTPATIENT)
Dept: OBGYN CLINIC | Facility: CLINIC | Age: 35
End: 2025-05-12

## 2025-05-16 ENCOUNTER — ROUTINE PRENATAL (OUTPATIENT)
Dept: OBGYN CLINIC | Facility: CLINIC | Age: 35
End: 2025-05-16

## 2025-05-16 VITALS — BODY MASS INDEX: 25.53 KG/M2 | WEIGHT: 163 LBS | DIASTOLIC BLOOD PRESSURE: 72 MMHG | SYSTOLIC BLOOD PRESSURE: 122 MMHG

## 2025-05-16 DIAGNOSIS — O09.523 AMA (ADVANCED MATERNAL AGE) MULTIGRAVIDA 35+, THIRD TRIMESTER: Primary | ICD-10-CM

## 2025-05-16 DIAGNOSIS — Z3A.38 38 WEEKS GESTATION OF PREGNANCY: ICD-10-CM

## 2025-05-16 PROCEDURE — PNV: Performed by: OBSTETRICS & GYNECOLOGY

## 2025-05-16 NOTE — PROGRESS NOTES
35 y.o.  female at 38w0d (Estimated Date of Delivery: 25) for PNV.    Pre-Adriane Vitals      Flowsheet Row Most Recent Value   Prenatal Assessment    Fetal Heart Rate 140   Movement Present   Presentation Vertex   Prenatal Vitals    Blood Pressure 122/72   Weight - Scale 73.9 kg (163 lb)   Urine Albumin/Glucose    Dilation/Effacement/Station    Cervical Dilation 3.5   Cervical Effacement 70   Fetal Station -2   Vaginal Drainage    Draining Fluid No   Edema           TWG: 15 kg (33 lb)    Leakage of fluid: no  Vaginal bleeding: no  Contractions/Cramping: no  Fetal movement: yes    Wants spontaneous labor.   Cervix is posterior. 3-4cm dilated   Cephalic on ultrasound.     Urine dip- protein trace, glucose neg.     RTO in 1 weeks.

## 2025-05-20 ENCOUNTER — ROUTINE PRENATAL (OUTPATIENT)
Dept: OBGYN CLINIC | Facility: CLINIC | Age: 35
End: 2025-05-20

## 2025-05-20 VITALS — SYSTOLIC BLOOD PRESSURE: 124 MMHG | BODY MASS INDEX: 25.69 KG/M2 | WEIGHT: 164 LBS | DIASTOLIC BLOOD PRESSURE: 80 MMHG

## 2025-05-20 DIAGNOSIS — O99.283 HYPERTHYROIDISM AFFECTING PREGNANCY IN THIRD TRIMESTER: Primary | ICD-10-CM

## 2025-05-20 DIAGNOSIS — O09.523 AMA (ADVANCED MATERNAL AGE) MULTIGRAVIDA 35+, THIRD TRIMESTER: ICD-10-CM

## 2025-05-20 DIAGNOSIS — E05.90 HYPERTHYROIDISM AFFECTING PREGNANCY IN THIRD TRIMESTER: Primary | ICD-10-CM

## 2025-05-20 DIAGNOSIS — Z3A.38 38 WEEKS GESTATION OF PREGNANCY: ICD-10-CM

## 2025-05-20 PROCEDURE — PNV: Performed by: OBSTETRICS & GYNECOLOGY

## 2025-05-20 NOTE — PROGRESS NOTES
35 y.o.  female at 38w4d (Estimated Date of Delivery: 25) for PNV.    Pre-Adriane Vitals      Flowsheet Row Most Recent Value   Prenatal Assessment    Fetal Heart Rate 145   Movement Present   Prenatal Vitals    Blood Pressure 124/80   Weight - Scale 74.4 kg (164 lb)   Urine Albumin/Glucose    Dilation/Effacement/Station    Cervical Dilation 3.5   Cervical Effacement 70   Fetal Station -1   Vaginal Drainage    Edema           TWG: 15.4 kg (34 lb)    Leakage of fluid: no  Vaginal bleeding: no  Contractions/Cramping: yes  Fetal movement: yes  Labor precautions reviewed  SVE 3-/70-1, soft, posterior. Encouraged membrane sweep next week.   Patient would like to await spontaneous labor  RTO in 1 weeks.

## 2025-05-20 NOTE — PATIENT INSTRUCTIONS
Am I in labour?    As you enter the final stages of your pregnancy, your body will give signs that labour is approaching. The following information should help you to understand these signs and make it easier for you to determine whether you are in labour.    Some of the signs and symptoms of going into labour may include:    period-like cramps  backache  diarrhoea  mucous discharge or ‘show’  gush or trickle of water as the membranes break  contractions  Engagement  As you move closer to delivery, your baby’s head may drop and become engaged in your pelvis in preparation for labour. If you are expecting your first baby, you may notice pressure in your groin and on your bladder beginning up to four weeks before the birth. You may also notice that you can breathe a little easier and have a little more appetite as your baby drops, and is not pushed up against your diaphragm and stomach quite so much. This is sometimes known as “lightening”, as women generally feel lighter.    Show    During your pregnancy a mucous plug fills your cervix. Towards the end of pregnancy, the cervix becomes softer and this mucous plug may become loosened and start to come away. The process of discharging this mucous is called a ‘show’ and might often contain streaks of blood or may also be brownish in colour. This is different from any flow of fresh blood which you would report immediately to your doctor or the hospital. The show may continue over a period of hours or even days. It is one of the signs that your body is preparing for birth. Labour may begin in the next few days, hours or weeks following a show. There is no need to phone the hospital if you have had a show.    Water breaking (rupture of membranes)    This may occur at any time prior to the start of labour, or at any time during labour. The break may be low, near the opening of the uterus, and will produce a gush of amniotic fluid. If this occurs, place a sanitary pad on and  "note the colour of the fluid. Ring the hospital and tell the midwife that this has occurred. You will usually be asked to come in to the hospital.    Another type of amniotic fluid leak may occur higher up in the amniotic sack, or top of the uterus. This will be less obvious to you and you may only notice a trickle of fluid. Since many women have a heavy vaginal discharge or leak a small amount of urine towards the end of their pregnancy and it is often difficult to tell the difference between urine and amniotic fluid - urine is often yellow; where amniotic fluid is usually clear, or has a pink tinge, and has a \"sweet\" odour. If you are unsure, ring the hospital.    If the colour of the fluid is green or brownish, it indicates that your baby has passed a bowel motion (meconium) inside the uterus. It is very common to have meconium-stained amniotic fluid in a pregnancy over 41 weeks, but this may also be a sign that your baby is distressed. You will need to ring the hospital immediately and then come into the hospital.    Contractions    [place] contractions  [place] contractions are sometimes mistaken for labour. These “practice” contractions usually start group home through the pregnancy and continue right through to the end. These contractions are often irregular and can be uncomfortable and tight. [place] contractions usually increase in regularity and strength towards the end of your pregnancy, preparing your uterus for the birth. Sometimes it is difficult to distinguish between these [place] contractions and labour contractions. Below are the common differences between the two.    Labour contractions  True labour contractions usually increase in strength and duration. In order to time your contractions, time the interval between the start of one contraction to the start of the next. Early labour contractions are often likened to period cramps with or without backache.    [place] Miller " contractions    Labour contractions    usually irregular and short    become regular with time    do not get closer together    get closer together with time    do not get stronger     become stronger    walking does not make them stronger  walking can make them stronger    lying down may make them go away   lying down does not make them go away    uncomfortable and tight--not painful   Painful - can't walk, talk or breathe through them        How does labour start?    Labour can start in different ways. You may be start experiencing some period like pains or contractions. You might notice that these tightenings/contractions start to get stronger, closer and last longer than before. Or you might start with some back ache or a stomach upset that gets stronger and develops into regular contractions. In approximately 10-15% of women, labour will start when your membranes rupture. Contractions usually follow.    Should I call my doctor?    You should call your doctor at [phone] when:    - your patrick break  - you have bright blood loss  - your contractions are regular and five minutes apart  - if you have decreased fetal movement

## 2025-05-23 ENCOUNTER — HOSPITAL ENCOUNTER (INPATIENT)
Facility: HOSPITAL | Age: 35
LOS: 1 days | Discharge: HOME | End: 2025-05-24
Attending: OBSTETRICS & GYNECOLOGY | Admitting: OBSTETRICS & GYNECOLOGY
Payer: COMMERCIAL

## 2025-05-23 ENCOUNTER — ANESTHESIA (INPATIENT)
Dept: ANESTHESIOLOGY | Facility: HOSPITAL | Age: 35
End: 2025-05-23
Payer: COMMERCIAL

## 2025-05-23 ENCOUNTER — NURSE TRIAGE (OUTPATIENT)
Dept: OTHER | Facility: OTHER | Age: 35
End: 2025-05-23

## 2025-05-23 ENCOUNTER — ANESTHESIA EVENT (INPATIENT)
Dept: ANESTHESIOLOGY | Facility: HOSPITAL | Age: 35
End: 2025-05-23
Payer: COMMERCIAL

## 2025-05-23 LAB
ABO GROUP BLD: NORMAL
BASE EXCESS BLDCOA CALC-SCNC: -2.7 MMOL/L (ref 3–11)
BASE EXCESS BLDCOV CALC-SCNC: -2.5 MMOL/L (ref 1–9)
BLD GP AB SCN SERPL QL: NEGATIVE
ERYTHROCYTE [DISTWIDTH] IN BLOOD BY AUTOMATED COUNT: 13.2 % (ref 11.6–15.1)
HCO3 BLDCOA-SCNC: 23.7 MMOL/L (ref 17.3–27.3)
HCO3 BLDCOV-SCNC: 21.1 MMOL/L (ref 12.2–28.6)
HCT VFR BLD AUTO: 34.3 % (ref 34.8–46.1)
HGB BLD-MCNC: 11.3 G/DL (ref 11.5–15.4)
HOLD SPECIMEN: NORMAL
MCH RBC QN AUTO: 31.4 PG (ref 26.8–34.3)
MCHC RBC AUTO-ENTMCNC: 32.9 G/DL (ref 31.4–37.4)
MCV RBC AUTO: 95 FL (ref 82–98)
O2 CT VFR BLDCOA CALC: 4.6 ML/DL
OXYHGB MFR BLDCOA: 22.2 %
OXYHGB MFR BLDCOV: 59.2 %
PCO2 BLDCOA: 47.1 MM[HG] (ref 30–60)
PCO2 BLDCOV: 33.2 MM HG (ref 27–43)
PH BLDCOA: 7.32 [PH] (ref 7.23–7.43)
PH BLDCOV: 7.42 [PH] (ref 7.19–7.49)
PLATELET # BLD AUTO: 279 THOUSANDS/UL (ref 149–390)
PMV BLD AUTO: 11.3 FL (ref 8.9–12.7)
PO2 BLDCOA: 13.6 MM HG (ref 5–25)
PO2 BLDCOV: 24.5 MM HG (ref 15–45)
RBC # BLD AUTO: 3.6 MILLION/UL (ref 3.81–5.12)
RH BLD: POSITIVE
SAO2 % BLDCOV: 11.8 ML/DL
SPECIMEN EXPIRATION DATE: NORMAL
TREPONEMA PALLIDUM IGG+IGM AB [PRESENCE] IN SERUM OR PLASMA BY IMMUNOASSAY: NORMAL
WBC # BLD AUTO: 12.97 THOUSAND/UL (ref 4.31–10.16)

## 2025-05-23 PROCEDURE — 86850 RBC ANTIBODY SCREEN: CPT

## 2025-05-23 PROCEDURE — 4A1HXCZ MONITORING OF PRODUCTS OF CONCEPTION, CARDIAC RATE, EXTERNAL APPROACH: ICD-10-PCS | Performed by: OBSTETRICS & GYNECOLOGY

## 2025-05-23 PROCEDURE — 86900 BLOOD TYPING SEROLOGIC ABO: CPT

## 2025-05-23 PROCEDURE — 0UQGXZZ REPAIR VAGINA, EXTERNAL APPROACH: ICD-10-PCS | Performed by: OBSTETRICS & GYNECOLOGY

## 2025-05-23 PROCEDURE — 10907ZC DRAINAGE OF AMNIOTIC FLUID, THERAPEUTIC FROM PRODUCTS OF CONCEPTION, VIA NATURAL OR ARTIFICIAL OPENING: ICD-10-PCS | Performed by: OBSTETRICS & GYNECOLOGY

## 2025-05-23 PROCEDURE — 82805 BLOOD GASES W/O2 SATURATION: CPT | Performed by: OBSTETRICS & GYNECOLOGY

## 2025-05-23 PROCEDURE — 59400 OBSTETRICAL CARE: CPT | Performed by: OBSTETRICS & GYNECOLOGY

## 2025-05-23 PROCEDURE — NC001 PR NO CHARGE: Performed by: OBSTETRICS & GYNECOLOGY

## 2025-05-23 PROCEDURE — 86901 BLOOD TYPING SEROLOGIC RH(D): CPT

## 2025-05-23 PROCEDURE — 99213 OFFICE O/P EST LOW 20 MIN: CPT

## 2025-05-23 PROCEDURE — 86780 TREPONEMA PALLIDUM: CPT

## 2025-05-23 PROCEDURE — 85027 COMPLETE CBC AUTOMATED: CPT

## 2025-05-23 RX ORDER — IBUPROFEN 600 MG/1
600 TABLET, FILM COATED ORAL EVERY 6 HOURS SCHEDULED
Status: DISCONTINUED | OUTPATIENT
Start: 2025-05-23 | End: 2025-05-23

## 2025-05-23 RX ORDER — POLYETHYLENE GLYCOL 3350 17 G/17G
17 POWDER, FOR SOLUTION ORAL DAILY PRN
Status: DISCONTINUED | OUTPATIENT
Start: 2025-05-23 | End: 2025-05-24 | Stop reason: HOSPADM

## 2025-05-23 RX ORDER — ALBUTEROL SULFATE 90 UG/1
2 INHALANT RESPIRATORY (INHALATION) EVERY 4 HOURS PRN
Status: DISCONTINUED | OUTPATIENT
Start: 2025-05-23 | End: 2025-05-24 | Stop reason: HOSPADM

## 2025-05-23 RX ORDER — SIMETHICONE 80 MG
80 TABLET,CHEWABLE ORAL 4 TIMES DAILY PRN
Status: DISCONTINUED | OUTPATIENT
Start: 2025-05-23 | End: 2025-05-24 | Stop reason: HOSPADM

## 2025-05-23 RX ORDER — ONDANSETRON 2 MG/ML
4 INJECTION INTRAMUSCULAR; INTRAVENOUS EVERY 8 HOURS PRN
Status: DISCONTINUED | OUTPATIENT
Start: 2025-05-23 | End: 2025-05-23

## 2025-05-23 RX ORDER — DOCUSATE SODIUM 100 MG/1
100 CAPSULE, LIQUID FILLED ORAL 2 TIMES DAILY
Status: DISCONTINUED | OUTPATIENT
Start: 2025-05-23 | End: 2025-05-24

## 2025-05-23 RX ORDER — LIDOCAINE HYDROCHLORIDE AND EPINEPHRINE 15; 5 MG/ML; UG/ML
INJECTION, SOLUTION EPIDURAL
Status: COMPLETED | OUTPATIENT
Start: 2025-05-23 | End: 2025-05-23

## 2025-05-23 RX ORDER — ACETAMINOPHEN 325 MG/1
975 TABLET ORAL EVERY 6 HOURS SCHEDULED
Status: DISCONTINUED | OUTPATIENT
Start: 2025-05-23 | End: 2025-05-23

## 2025-05-23 RX ORDER — IBUPROFEN 100 MG/5ML
600 SUSPENSION ORAL EVERY 6 HOURS
Status: DISCONTINUED | OUTPATIENT
Start: 2025-05-23 | End: 2025-05-24 | Stop reason: HOSPADM

## 2025-05-23 RX ORDER — OXYTOCIN/0.9 % SODIUM CHLORIDE 30/500 ML
PLASTIC BAG, INJECTION (ML) INTRAVENOUS
Status: DISPENSED
Start: 2025-05-23 | End: 2025-05-23

## 2025-05-23 RX ORDER — LIDOCAINE 50 MG/G
1 PATCH TOPICAL DAILY
Status: DISCONTINUED | OUTPATIENT
Start: 2025-05-23 | End: 2025-05-24 | Stop reason: HOSPADM

## 2025-05-23 RX ORDER — OXYTOCIN/0.9 % SODIUM CHLORIDE 30/500 ML
250 PLASTIC BAG, INJECTION (ML) INTRAVENOUS ONCE
Status: COMPLETED | OUTPATIENT
Start: 2025-05-23 | End: 2025-05-23

## 2025-05-23 RX ORDER — ACETAMINOPHEN 160 MG/1
960 BAR, CHEWABLE ORAL EVERY 6 HOURS
Status: DISCONTINUED | OUTPATIENT
Start: 2025-05-23 | End: 2025-05-23

## 2025-05-23 RX ORDER — BENZOCAINE/MENTHOL 6 MG-10 MG
1 LOZENGE MUCOUS MEMBRANE DAILY PRN
Status: DISCONTINUED | OUTPATIENT
Start: 2025-05-23 | End: 2025-05-24 | Stop reason: HOSPADM

## 2025-05-23 RX ORDER — ACETAMINOPHEN 160 MG/5ML
650 SUSPENSION ORAL EVERY 6 HOURS SCHEDULED
Status: DISCONTINUED | OUTPATIENT
Start: 2025-05-23 | End: 2025-05-24 | Stop reason: HOSPADM

## 2025-05-23 RX ORDER — CALCIUM CARBONATE 500 MG/1
1000 TABLET, CHEWABLE ORAL DAILY PRN
Status: DISCONTINUED | OUTPATIENT
Start: 2025-05-23 | End: 2025-05-24 | Stop reason: HOSPADM

## 2025-05-23 RX ORDER — SODIUM CHLORIDE, SODIUM LACTATE, POTASSIUM CHLORIDE, CALCIUM CHLORIDE 600; 310; 30; 20 MG/100ML; MG/100ML; MG/100ML; MG/100ML
125 INJECTION, SOLUTION INTRAVENOUS CONTINUOUS
Status: DISCONTINUED | OUTPATIENT
Start: 2025-05-23 | End: 2025-05-23

## 2025-05-23 RX ORDER — BUPIVACAINE HYDROCHLORIDE 2.5 MG/ML
30 INJECTION, SOLUTION EPIDURAL; INFILTRATION; INTRACAUDAL; PERINEURAL ONCE AS NEEDED
Status: DISCONTINUED | OUTPATIENT
Start: 2025-05-23 | End: 2025-05-23

## 2025-05-23 RX ORDER — DIPHENHYDRAMINE HYDROCHLORIDE 50 MG/ML
25 INJECTION, SOLUTION INTRAMUSCULAR; INTRAVENOUS EVERY 6 HOURS PRN
Status: DISCONTINUED | OUTPATIENT
Start: 2025-05-23 | End: 2025-05-24 | Stop reason: HOSPADM

## 2025-05-23 RX ORDER — ONDANSETRON 2 MG/ML
4 INJECTION INTRAMUSCULAR; INTRAVENOUS EVERY 8 HOURS PRN
Status: DISCONTINUED | OUTPATIENT
Start: 2025-05-23 | End: 2025-05-24 | Stop reason: HOSPADM

## 2025-05-23 RX ORDER — ONDANSETRON 2 MG/ML
INJECTION INTRAMUSCULAR; INTRAVENOUS
Status: COMPLETED
Start: 2025-05-23 | End: 2025-05-23

## 2025-05-23 RX ADMIN — ACETAMINOPHEN 650 MG: 650 SUSPENSION ORAL at 17:05

## 2025-05-23 RX ADMIN — ONDANSETRON 4 MG: 2 INJECTION INTRAMUSCULAR; INTRAVENOUS at 11:21

## 2025-05-23 RX ADMIN — HYDROCORTISONE 1 APPLICATION: 1 CREAM TOPICAL at 14:50

## 2025-05-23 RX ADMIN — SODIUM CHLORIDE, SODIUM LACTATE, POTASSIUM CHLORIDE, AND CALCIUM CHLORIDE 999 ML/HR: .6; .31; .03; .02 INJECTION, SOLUTION INTRAVENOUS at 08:15

## 2025-05-23 RX ADMIN — IBUPROFEN 600 MG: 100 SUSPENSION ORAL at 15:10

## 2025-05-23 RX ADMIN — ROPIVACAINE HYDROCHLORIDE: 2 INJECTION, SOLUTION EPIDURAL; INFILTRATION at 08:47

## 2025-05-23 RX ADMIN — Medication 250 MILLI-UNITS/MIN: at 13:59

## 2025-05-23 RX ADMIN — SODIUM CHLORIDE, SODIUM LACTATE, POTASSIUM CHLORIDE, AND CALCIUM CHLORIDE 125 ML/HR: .6; .31; .03; .02 INJECTION, SOLUTION INTRAVENOUS at 09:30

## 2025-05-23 RX ADMIN — WITCH HAZEL 1 PAD: 500 SOLUTION RECTAL; TOPICAL at 14:50

## 2025-05-23 RX ADMIN — IBUPROFEN 600 MG: 100 SUSPENSION ORAL at 21:55

## 2025-05-23 RX ADMIN — LIDOCAINE 1 PATCH: 50 PATCH CUTANEOUS at 22:27

## 2025-05-23 RX ADMIN — BENZOCAINE AND LEVOMENTHOL 1 APPLICATION: 200; 5 SPRAY TOPICAL at 14:50

## 2025-05-23 RX ADMIN — LIDOCAINE HYDROCHLORIDE AND EPINEPHRINE 3 ML: 15; 5 INJECTION, SOLUTION EPIDURAL at 08:37

## 2025-05-23 RX ADMIN — SODIUM CHLORIDE, SODIUM LACTATE, POTASSIUM CHLORIDE, AND CALCIUM CHLORIDE 999 ML/HR: .6; .31; .03; .02 INJECTION, SOLUTION INTRAVENOUS at 07:56

## 2025-05-23 NOTE — ANESTHESIA PREPROCEDURE EVALUATION
Procedure:  LABOR ANALGESIA    Relevant Problems   GYN   (+) AMA (advanced maternal age) multigravida 35+, third trimester   (+) Labor      MUSCULOSKELETAL   (+) Exercise-induced asthma   (+) Seropositive rheumatoid arthritis of multiple sites (HCC)      Other   (+) Preauricular adenopathy        Physical Exam    Airway     Mallampati score: II  TM Distance: >3 FB  Neck ROM: full      Cardiovascular      Dental       Pulmonary      Neurological      Other Findings  post-pubertal.      Anesthesia Plan  ASA Score- 2     Anesthesia Type- epidural with ASA Monitors.         Additional Monitors:     Airway Plan:            Plan Factors-    Chart reviewed.                      Induction-     Postoperative Plan- .   Monitoring Plan - Monitoring plan - standard ASA monitoring      Perioperative Resuscitation Plan - Level 1 - Full Code.       Informed Consent- Anesthetic plan and risks discussed with patient.  I personally reviewed this patient with the CRNA. Discussed and agreed on the Anesthesia Plan with the CRNA..      NPO Status:  No vitals data found for the desired time range.

## 2025-05-23 NOTE — PLAN OF CARE
Problem: BIRTH - VAGINAL/ SECTION  Goal: Fetal and maternal status remain reassuring during the birth process  Description: INTERVENTIONS:  - Monitor vital signs  - Monitor fetal heart rate  - Monitor uterine activity  - Monitor labor progression (vaginal delivery)  - DVT prophylaxis  - Antibiotic prophylaxis  2025 by Unique Emmanuel RN  Outcome: Completed  2025 by Unique Emmanuel RN  Outcome: Progressing  Goal: Emotionally satisfying birthing experience for mother/fetus  Description: Interventions:  - Assess, plan, implement and evaluate the nursing care given to the patient in labor  - Advocate the philosophy that each childbirth experience is a unique experience and support the family's chosen level of involvement and control during the labor process   - Actively participate in both the patient's and family's teaching of the birth process  - Consider cultural, Voodoo and age-specific factors and plan care for the patient in labor  2025 by Unique Emmanuel RN  Outcome: Completed  2025 by Unique Emmanuel RN  Outcome: Progressing     Problem: POSTPARTUM  Goal: Experiences normal postpartum course  Description: INTERVENTIONS:  - Monitor maternal vital signs  - Assess uterine involution and lochia  Outcome: Progressing  Goal: Appropriate maternal -  bonding  Description: INTERVENTIONS:  - Identify family support  - Assess for appropriate maternal/infant bonding   -Encourage maternal/infant bonding opportunities  - Referral to  or  as needed  Outcome: Progressing  Goal: Establishment of infant feeding pattern  Description: INTERVENTIONS:  - Assess breast/bottle feeding  - Refer to lactation as needed  Outcome: Progressing

## 2025-05-23 NOTE — OB LABOR/OXYTOCIN SAFETY PROGRESS
Labor Progress Note - Lori Camacho 35 y.o. female MRN: [id number]    Unit/Bed#: -01 Encounter: [id number]       Contraction Frequency (minutes): 4-5  Contraction Intensity: Strong  Uterine Activity Characteristics: Regular  Cervical Dilation: 10  Dilation Complete Date: 05/23/25  Dilation Complete Time: 1014  Cervical Effacement: 100  Fetal Station: -1  Baseline Rate (FHR): 135 bpm  Fetal Heart Rate (FHT): 140 BPM               Vital Signs:   Vitals:    05/23/25 1257   BP: 127/55   Pulse: 102   Resp:    Temp:    SpO2:        Notes/comments:   Patient was complete at 1014, during the check membranes ruptured for clear fluid. Station -1. Patient comfortable at that time with no urge to push. She was placed in throne position.   Re-evaluated around noon, baby was + 2, mom feeling pressure therefore she started to push at 1204.   Fetal tracing with periods of decreased variability during pushing with variable decelerations during contraction.  Will continue to push.         Brisa Medeiros DO 5/23/2025 1:17 PM

## 2025-05-23 NOTE — TELEPHONE ENCOUNTER
"FOLLOW UP: None at this time    REASON FOR CONVERSATION: Vaginal Bleeding - Pregnant    SYMPTOMS: Bloody vaginal discharge, lower abdominal cramping    OTHER: Discussed with on call provider who advised patient to monitor contractions, once they become 5 minutes apart for >1 hour to present to L&D. Advised to call back when this occurs or with new/worsening symptoms such as increased vaginal bleeding, leakage of fluid, new swelling, or changes in fetal movement. Patient verbalized understanding and is agreeable.     DISPOSITION: Home Care        Reason for Disposition   [1] Pregnant 37 or more weeks (term) AND [2] passed a small glob or chunk of mucous (may look like gelatin or snot)    Answer Assessment - Initial Assessment Questions  1. ONSET: \"When did this bleeding start?\"           2 AM this morning had pink blood when wiping had some more 2 other times after going to bathroom     2. BLEEDING SEVERITY: \"Describe the bleeding that you are having.\" \"How much bleeding is there?\"         Pink blood when wiping only, not bleeding onto underwear     3. ABDOMEN PAIN: \"Do you have any pain?\" \"How bad is the pain?\"  (e.g., Scale 0-10; none, mild, moderate, or severe)        Having lower abdominal cramping every 5-10 minutes lasting about 10 seconds 4/10    4. PREGNANCY: \"Do you know how many weeks or months pregnant you are?\"         39w 0d    5. MIN: \"What date are you expecting to deliver?\"        5/30/25    6. FETAL MOVEMENT: \"Has the baby's movement decreased or changed significantly from normal?\"        Fetal movement is about the same     7. HIGH-RISK PREGNANCY:  \"Have been told by your doctor that you have a high-risk condition that can cause bleeding?\"  (e.g., placenta previa, vasa previa)         Denies but possibly because of age     8. ULTRASOUND: \"Have you had an ultrasound during this pregnancy?\"  Note: To confirm intrauterine pregnancy, placenta location.        Yes    9. HEMODYNAMIC STATUS: \"Are you weak " "or feeling lightheaded?\" If Yes, ask: \"Can you stand and walk normally?\"         Denies LH/dizziness, denies weakness    10. OTHER SYMPTOMS: \"What other symptoms are you having with the bleeding?\" (e.g., leaking fluid from vagina, contractions)          Denies new swelling, some back pain pretty consistent, no other symptoms    Protocols used: Pregnancy - Vaginal Bleeding Greater Than 20 Weeks EGA-Adult-    "

## 2025-05-23 NOTE — ANESTHESIA POSTPROCEDURE EVALUATION
Post-Op Assessment Note    CV Status:  Stable  Pain Score: 0    Pain management: adequate      Post-op block assessment: catheter intact and no complications   Mental Status:  Alert and awake   Hydration Status:  Euvolemic   PONV Controlled:  Controlled   Airway Patency:  Patent     Post Op Vitals Reviewed: Yes    No anethesia notable event occurred.    Staff: Anesthesiologist, with CRNAs           Last Filed PACU Vitals:  Vitals Value Taken Time   Temp     Pulse     BP     Resp     SpO2

## 2025-05-23 NOTE — PLAN OF CARE
Problem: PAIN - ADULT  Goal: Verbalizes/displays adequate comfort level or baseline comfort level  Description: Interventions:  - Encourage patient to monitor pain and request assistance  - Assess pain using appropriate pain scale  - Administer analgesics as ordered based on type and severity of pain and evaluate response  - Implement non-pharmacological measures as appropriate and evaluate response  - Consider cultural and social influences on pain and pain management  - Notify physician/advanced practitioner if interventions unsuccessful or patient reports new pain  - Educate patient/family on pain management process including their role and importance of  reporting pain   - Provide non-pharmacologic/complimentary pain relief interventions  Outcome: Progressing     Problem: SAFETY ADULT  Goal: Patient will remain free of falls  Description: INTERVENTIONS:  - Educate patient/family on patient safety including physical limitations  - Instruct patient to call for assistance with activity   - Consider consulting OT/PT to assist with strengthening/mobility based on AM PAC & -HLM score  - Consult OT/PT to assist with strengthening/mobility   - Keep Call bell within reach  - Keep bed low and locked with side rails adjusted as appropriate  - Keep care items and personal belongings within reach  - Initiate and maintain comfort rounds  Outcome: Progressing  Goal: Maintain or return to baseline ADL function  Description: INTERVENTIONS:  -  Assess patient's ability to carry out ADLs; assess patient's baseline for ADL function and identify physical deficits which impact ability to perform ADLs (bathing, care of mouth/teeth, toileting, grooming, dressing, etc.)  - Assess/evaluate cause of self-care deficits   - Assess range of motion  - Assess patient's mobility; develop plan if impaired  - Assess patient's need for assistive devices and provide as appropriate  - Encourage maximum independence but intervene and supervise  when necessary  - Involve family in performance of ADLs  - Assess for home care needs following discharge   - Consider OT consult to assist with ADL evaluation and planning for discharge  - Provide patient education as appropriate  - Monitor functional capacity and physical performance, use of AM PAC & JH-HLM   - Monitor gait, balance and fatigue with ambulation    Outcome: Progressing  Goal: Maintains/Returns to pre admission functional level  Description: INTERVENTIONS:  - Perform AM-PAC 6 Click Basic Mobility/ Daily Activity assessment daily.  - Set and communicate daily mobility goal to care team and patient/family/caregiver.   - Collaborate with rehabilitation services on mobility goals if consulted  Outcome: Progressing     Problem: DISCHARGE PLANNING  Goal: Discharge to home or other facility with appropriate resources  Description: INTERVENTIONS:  - Identify barriers to discharge w/patient and caregiver  - Arrange for needed discharge resources and transportation as appropriate  - Identify discharge learning needs (meds, wound care, etc.)  - Arrange for interpretive services to assist at discharge as needed  - Refer to Case Management Department for coordinating discharge planning if the patient needs post-hospital services based on physician/advanced practitioner order or complex needs related to functional status, cognitive ability, or social support system  Outcome: Progressing     Problem: Knowledge Deficit  Goal: Patient/family/caregiver demonstrates understanding of disease process, treatment plan, medications, and discharge instructions  Description: Complete learning assessment and assess knowledge base.  Interventions:  - Provide teaching at level of understanding  - Provide teaching via preferred learning methods  Outcome: Progressing     Problem: BIRTH - VAGINAL/ SECTION  Goal: Fetal and maternal status remain reassuring during the birth process  Description: INTERVENTIONS:  - Monitor  vital signs  - Monitor fetal heart rate  - Monitor uterine activity  - Monitor labor progression (vaginal delivery)  - DVT prophylaxis  - Antibiotic prophylaxis  Outcome: Progressing  Goal: Emotionally satisfying birthing experience for mother/fetus  Description: Interventions:  - Assess, plan, implement and evaluate the nursing care given to the patient in labor  - Advocate the philosophy that each childbirth experience is a unique experience and support the family's chosen level of involvement and control during the labor process   - Actively participate in both the patient's and family's teaching of the birth process  - Consider cultural, Congregation and age-specific factors and plan care for the patient in labor  Outcome: Progressing      patient states she is always tired/yes

## 2025-05-23 NOTE — DISCHARGE SUMMARY
Discharge Summary - OB/GYN  Lori Camacho 35 y.o. female MRN: [id number]  Unit/Bed#: -01 Encounter: [id number]    Admission Date: 2025     Discharge Date: 2025    Admitting Attending: Brisa Medeiros DO    Delivering Attending: Dr. Medeiros    Discharging Attending: Dr. Medeiros    Principal Diagnosis: Pregnancy at 39w0d    Secondary Diagnosis:  Rheumatoid arthritis  Exercise-induced asthma  AMA    Procedures: spontaneous vaginal delivery, right vaginal laceration repair    Anesthesia: epidural    Hospital course:  Lori Camacho is a 35 y.o.  at 39w0d who was initially admitted for labor with SVE of 6/100/0.  She received an epidural for pain control.  She was allowed to progress.  Amniotomy was performed for clear fluid.    She delivered a viable female  on 2025 at 1359. Weight 7 lbs 12.3 oz via normal spontaneous vaginal delivery. She sustained a right vaginal laceration during delivery which was adequately repaired. Apgars were 9 (1 min) and 9 (5 min).  was transferred to  nursery. Patient tolerated the procedure well.     Her post-delivery course was complicated by a spinal headache requiring a blood patch. Headache vastly improved after blood patch. Her postpartum pain was well controlled with oral analgesics.    On day of discharge, she was ambulating and able to reasonably perform all ADLs. She was voiding and had appropriate bowel function. Pain was well controlled. She was discharged home on postpartum day #1 without complications. Patient was instructed to follow up with her OB as an outpatient and was given appropriate warnings to call provider if she develops signs of infection or uncontrolled pain.    Complications: none apparent    Condition at discharge: good     Discharge instructions/Information to patient and family:   See after visit summary for information provided to patient and family.      Provisions for Follow-Up Care:  See  after visit summary for information related to follow-up care and any pertinent home health orders.      Disposition: See After Visit Summary for discharge disposition information.    Planned Readmission: No    Discharge medications and instructions:   Please see AVS for full list of medications upon discharge.        Vandana Scott MD  PGY-2 OB/GYN

## 2025-05-23 NOTE — L&D DELIVERY NOTE
Vaginal Delivery Summary - OB/GYN   Lori Camacho 35 y.o. female MRN: [id number]  Unit/Bed#: -01 Encounter: [id number]      Pre-delivery Diagnosis:   1. Pregnancy at 39 weeks   2. Labor  3. Seropositive rheumatoid arthritis of multiple sites    Post-delivery Diagnosis: same, delivered    Procedure: Spontaneous Vaginal Delivery, repair of right vaginal laceration    Attending: Mala     Assistant(s): Barr    Anesthesia: Epidural    QBL: 162 mL    Complications: none apparent    Specimens:   1. Arterial and venous cord gases  2. Cord blood  3. Segment of umbilical cord  4. Placenta to storage     Findings:  1. Viable female on 2025 at 1359, with APGARS of 9 and 9 at 1 and 5 minutes respectively  2. Spontaneous delivery of intact placenta at 1427  3. Right vaginal laceration repaired with 3-0 vicryl rapide  4. Blood gases:   Arterial pH: 7.320   Arterial base excess: -2.7   Venous pH: 7.421   Venous base excess: -2.5    Disposition:  Patient tolerated the procedure well and was recovering in labor and delivery room       Brief history and labor course:  Lori Camacho is a 35 y.o.  at 39w0d who was initially admitted for labor with SVE of 6/100/0.  She received an epidural for pain control.  She was allowed to progress.  Amniotomy was performed for clear fluid. She progressed to complete and started pushing.     Description of procedure:  After pushing for 115 minutes, at 1359 patient delivered a viable female , wt pending, apgars of 9 (1 min) and 9 (5 min). The fetal vertex delivered spontaneously. There was no nuchal cord. The right anterior shoulder delivered atraumatically with maternal expulsive forces and the assistance of gentle downward traction. The left posterior shoulder delivered with maternal expulsive forces and the assistance of gentle upward traction. The remainder of the fetus delivered spontaneously.     Upon delivery, the infant was placed on the mothers  abdomen and the cord was clamped and cut after delayed cord clamping. The infant was noted to cry spontaneously and was moving all extremities appropriately. There was no evidence for injury. Awaiting nurse resuscitators evaluated the . Arterial and venous cord blood gases and cord blood was collected for analysis. These were promptly sent to the lab. In the immediate post-partum, 30 units of IV pitocin was administered, and the uterus was noted to contract down well with massage and pitocin. The placenta delivered spontaneously at 1427 and was noted to have a centrally inserted 3 vessel cord.     The vagina, cervix, perineum, and rectum were inspected and there was noted to be a small right vaginal laceration that was repaired with 3-0 Vicryl Rapide in a running locked fashion.  There was a right labial cyst noted at the time of delivery which was round and well-circumscribed and mobile.  It was not in the location of a Bartholin cyst gland.  There was no pain induration or erythema.    Bimanual exam revealed a small amount of retained membranes which were removed.  After removal there was minimal clots and good uterine tone.  The fundus was firm and at the level of the umbilicus.  At the conclusion of the procedure, all needle, sponge, and instrument counts were noted to be correct. Patient tolerated the procedure well and was allowed to recover in labor and delivery room with family and  before being transferred to the post-partum floor. Dr. Medeiros was present and participated in all key portions of the case.      Vandana Scott MD  2025  2:31 PM

## 2025-05-23 NOTE — TELEPHONE ENCOUNTER
"FOLLOW UP: No, patient will be going to L&D at this time     REASON FOR CONVERSATION: Contractions    SYMPTOMS: contractions    OTHER: N/A    DISPOSITION: Go to LD Now        Reason for Disposition   [1] First baby (primipara) AND [2] contractions < 6 minutes apart  AND [3] present 2 hours    Answer Assessment - Initial Assessment Questions  1. ONSET: \"When did the symptoms begin?\"         0200 this morning    2. CONTRACTIONS: \"Describe the contractions that you are having.\" (e.g., duration, frequency, regularity, severity)      Every 4-5 minutes lasting approx 45-60 seconds    3. PREGNANCY: \"How many weeks pregnant are you?\"      39 weeks    4. MIN: \"What date are you expecting to deliver?\"      08/30/2025    5. PARITY: \"Have you had a baby before?\" If Yes, ask: \"How long did the labor last?\"      G1    6. FETAL MOVEMENT: \"Has the baby's movement decreased or changed significantly from normal?\"      Normal    7. OTHER SYMPTOMS: \"Do you have any other symptoms?\" (e.g., leaking fluid from vagina, vaginal bleeding, fever, hand/facial swelling)      Pt states she lost her mucous plug yesterday and states she has some light pink blood this morning    Protocols used: Pregnancy - Labor-Adult-AH    "

## 2025-05-23 NOTE — H&P
H & P- Obstetrics   Lori Camacho 35 y.o. female MRN: [id number]  Unit/Bed#: LD TRIAGE 4 Encounter: [id number]    Assessment: 35 y.o.  at 39w0d admitted for labor .  FHT: cat 1, 130bpm, moderate variability, 15x15 accels    Plan:   Labor  Assessment & Plan  Admit to OBN   Clear liquid diet   F/u T&S, CBC, RPR   IVF LR 125cc/hr   Continuous fetal monitoring and tocometry   Analgesia at maternal request   Vertex by TAUS  GBS neg  EFW 82% at 32w  /0  Expectant management    Seropositive rheumatoid arthritis of multiple sites ([facility])  Assessment & Plan  On Remicade since   Quantiferon gold neg 23      Discussed case and plan w/ Dr. Medeiros    Chief Complaint: contractions    HPI: Lori Camacho is a 35 y.o.  with an MIN of 2025, by Ultrasound at 39w0d who is being admitted for labor . She complains of uterine contractions, occurring every 2-3 minutes, has no LOF, and reports no VB. She states she has felt good FM.    Problem List[1]    Baby complications/comments: none    Review of Systems   Constitutional:  Negative for chills and fever.   HENT:  Negative for ear pain and sore throat.    Eyes:  Negative for pain and visual disturbance.   Respiratory:  Negative for cough and shortness of breath.    Cardiovascular:  Negative for chest pain and palpitations.   Gastrointestinal:  Negative for abdominal pain and vomiting.   Genitourinary:  Negative for dysuria and hematuria.   Musculoskeletal:  Negative for arthralgias and back pain.   Skin:  Negative for color change and rash.   Neurological:  Negative for seizures and syncope.   All other systems reviewed and are negative.      OB Hx:  OB History    Para Term  AB Living   2 0 0 0 1 0   SAB IAB Ectopic Multiple Live Births   1 0 0 0 0      # Outcome Date GA Lbr Michael/2nd Weight Sex Type Anes PTL Lv   2 Current            1 SAB 24 6w0d    SAB   FD      Obstetric Comments   Menarche 12       Past Medical  Hx:  Past Medical History[2]    Past Surgical hx:  Past Surgical History[3]      Allergies[4]      Facility-Administered Medications Prior to Admission:     ondansetron (ZOFRAN-ODT) dispersible tablet 4 mg    Medications Prior to Admission:     CALCIUM PO    Cholecalciferol (Vitamin D) 125 MCG (5000 UT) CAPS    Prenatal Vit-Fe Fumarate-FA (PRENATAL VITAMIN PO)    cyclobenzaprine (FLEXERIL) 5 mg tablet    inFLIXimab (REMICADE) 100 mg    Objective:  Temp:  [97.8 °F (36.6 °C)] 97.8 °F (36.6 °C)  HR:  [74] 74  BP: (123)/(64) 123/64  Resp:  [16] 16  SpO2:  [100 %] 100 %  Body mass index is 25.69 kg/m².     Physical Exam:  Physical Exam  Constitutional:       Appearance: Normal appearance.   HENT:      Head: Atraumatic.     Eyes:      Extraocular Movements: Extraocular movements intact.      Conjunctiva/sclera: Conjunctivae normal.       Cardiovascular:      Rate and Rhythm: Normal rate and regular rhythm.      Pulses: Normal pulses.   Pulmonary:      Effort: Pulmonary effort is normal. No respiratory distress.      Breath sounds: Normal breath sounds.   Abdominal:      Palpations: Abdomen is soft.      Tenderness: There is no abdominal tenderness.     Neurological:      General: No focal deficit present.      Mental Status: She is alert and oriented to person, place, and time.     Skin:     General: Skin is warm and dry.     Psychiatric:         Mood and Affect: Mood normal.         Behavior: Behavior normal.   Vitals reviewed. Exam conducted with a chaperone present.          TOCO:   Contraction Intensity: Mild/Moderate    Lab Results   Component Value Date    WBC 8.25 03/04/2025    HGB 10.8 (L) 03/04/2025    HCT 32.2 (L) 03/04/2025     03/04/2025     Lab Results   Component Value Date     10/09/2014    K 3.4 (L) 03/04/2025     03/04/2025    CO2 26 03/04/2025    BUN 7 03/04/2025    CREATININE 0.46 (L) 03/04/2025    GLUCOSE 82 10/09/2014    AST 10 (L) 03/04/2025    ALT 6 (L) 03/04/2025     Prenatal  Labs: Reviewed, unremarkable     >2 Midnights  INPATIENT     Signature/Title: Rosenda Beatty MD  Date: 5/23/2025  Time: 7:46 AM        [1]   Patient Active Problem List  Diagnosis    Seropositive rheumatoid arthritis of multiple sites ([facility])    Vitamin D insufficiency    Exercise-induced asthma    Autoimmune thyroiditis    Preauricular adenopathy    Seasonal allergies    Low TSH level    Hyperthyroidism affecting pregnancy    AMA (advanced maternal age) multigravida 35+, third trimester    Labor   [2]   Past Medical History:  Diagnosis Date    Abnormal Pap smear of cervix     Asthma     exercise induced    ANDREA III (cervical intraepithelial neoplasia grade III) with severe dysplasia 06/26/2019    Exercise-induced asthma     HPV (human papilloma virus) infection     Pregnancy 11/12/2024    RA (rheumatoid arthritis) ([facility])     Seasonal allergies     Varicella     Vaccine   [3]   Past Surgical History:  Procedure Laterality Date    CERVICAL BIOPSY  W/ LOOP ELECTRODE EXCISION  Cryo surgery    2017    DENTAL SURGERY     [4]   Allergies  Allergen Reactions    Amoxicillin Other (See Comments)     Childhood allergy     Other Other (See Comments)     Seasonal- Runny nose/ congestion

## 2025-05-23 NOTE — ANESTHESIA PROCEDURE NOTES
Epidural Block    Patient location during procedure: OB/L&D  Start time: 5/23/2025 8:33 AM  Reason for block: procedure for pain  Staffing  Performed by: Terrance Braun CRNA  Authorized by: Terrance Braun CRNA    Preanesthetic Checklist  Completed: patient identified, IV checked, site marked, risks and benefits discussed, surgical consent, monitors and equipment checked, pre-op evaluation and timeout performed  Epidural  Patient position: sitting  Prep: ChloraPrep  Sedation Level: no sedation  Patient monitoring: frequent blood pressure checks, continuous pulse oximetry and heart rate  Approach: midline  Location: lumbar, L3-4  Injection technique: STEVE saline  Needle  Needle type: Tuohy   Needle gauge: 18 G  Needle insertion depth: 6 cm  Catheter type: multi-orifice  Catheter size: 20 G  Catheter at skin depth: 11 cm  Catheter securement method: stabilization device and clear occlusive dressing  Test dose: negativelidocaine-epinephrine (XYLOCAINE-MPF/EPINEPHRINE) 1.5 %-1:200,000 injection 3 mL - Epidural   3 mL - 5/23/2025 8:37:00 AM  Assessment  Sensory level: T10  Number of attempts: 2negative aspiration for CSF, negative aspiration for heme and no paresthesia on injection  patient tolerated the procedure well with no immediate complications  Additional Notes  Two attempts by CRNA VanBastelaar

## 2025-05-23 NOTE — ASSESSMENT & PLAN NOTE
Continue routine post partum care  Pain well controlled: tylenol/motrin scheduled  Lochia within normal limits: continue to monitor   OOB: as able, encourage ambulation  Passing flatus  Voiding spontaneously  Contraception: undecided, considering depo  Anticipate d/c home tomorrow on PPD#2

## 2025-05-24 ENCOUNTER — ANESTHESIA EVENT (INPATIENT)
Dept: ANESTHESIOLOGY | Facility: HOSPITAL | Age: 35
End: 2025-05-24
Payer: COMMERCIAL

## 2025-05-24 ENCOUNTER — ANESTHESIA (INPATIENT)
Dept: ANESTHESIOLOGY | Facility: HOSPITAL | Age: 35
End: 2025-05-24
Payer: COMMERCIAL

## 2025-05-24 VITALS
WEIGHT: 164 LBS | OXYGEN SATURATION: 100 % | TEMPERATURE: 97.6 F | RESPIRATION RATE: 18 BRPM | BODY MASS INDEX: 25.74 KG/M2 | DIASTOLIC BLOOD PRESSURE: 62 MMHG | SYSTOLIC BLOOD PRESSURE: 104 MMHG | HEIGHT: 67 IN | HEART RATE: 74 BPM

## 2025-05-24 PROBLEM — M54.2 NECK PAIN: Status: ACTIVE | Noted: 2025-05-24

## 2025-05-24 PROCEDURE — 99024 POSTOP FOLLOW-UP VISIT: CPT | Performed by: OBSTETRICS & GYNECOLOGY

## 2025-05-24 PROCEDURE — NC001 PR NO CHARGE: Performed by: OBSTETRICS & GYNECOLOGY

## 2025-05-24 RX ORDER — IBUPROFEN 100 MG/5ML
600 SUSPENSION ORAL EVERY 6 HOURS
Start: 2025-05-24

## 2025-05-24 RX ORDER — ALBUTEROL SULFATE 90 UG/1
2 INHALANT RESPIRATORY (INHALATION) EVERY 4 HOURS PRN
Start: 2025-05-24

## 2025-05-24 RX ORDER — LIDOCAINE 50 MG/G
1 PATCH TOPICAL DAILY
Qty: 10 PATCH | Refills: 0 | Status: SHIPPED | OUTPATIENT
Start: 2025-05-24

## 2025-05-24 RX ORDER — ACETAMINOPHEN 160 MG/5ML
650 SUSPENSION ORAL EVERY 6 HOURS SCHEDULED
Start: 2025-05-24

## 2025-05-24 RX ORDER — POLYETHYLENE GLYCOL 3350 17 G/17G
17 POWDER, FOR SOLUTION ORAL DAILY
Start: 2025-05-24

## 2025-05-24 RX ORDER — SIMETHICONE 80 MG
80 TABLET,CHEWABLE ORAL 4 TIMES DAILY PRN
Start: 2025-05-24

## 2025-05-24 RX ORDER — BENZOCAINE/MENTHOL 6 MG-10 MG
1 LOZENGE MUCOUS MEMBRANE DAILY PRN
Start: 2025-05-24

## 2025-05-24 RX ORDER — POLYETHYLENE GLYCOL 3350 17 G/17G
17 POWDER, FOR SOLUTION ORAL DAILY
Status: DISCONTINUED | OUTPATIENT
Start: 2025-05-24 | End: 2025-05-24 | Stop reason: HOSPADM

## 2025-05-24 RX ADMIN — ACETAMINOPHEN 650 MG: 650 SUSPENSION ORAL at 12:29

## 2025-05-24 RX ADMIN — IBUPROFEN 600 MG: 100 SUSPENSION ORAL at 10:06

## 2025-05-24 RX ADMIN — ACETAMINOPHEN 650 MG: 650 SUSPENSION ORAL at 06:16

## 2025-05-24 RX ADMIN — IBUPROFEN 600 MG: 100 SUSPENSION ORAL at 04:03

## 2025-05-24 RX ADMIN — IBUPROFEN 600 MG: 100 SUSPENSION ORAL at 16:39

## 2025-05-24 RX ADMIN — ACETAMINOPHEN 650 MG: 650 SUSPENSION ORAL at 00:35

## 2025-05-24 NOTE — CONSULTS
This note was copied from a baby's chart.  Inpatient Lactation consult is being closed due to lactation services provided. Please refer to previous Lactation notes to see feeding plan for the breastfeeding dyad.

## 2025-05-24 NOTE — ASSESSMENT & PLAN NOTE
Consistent with muscle spasm  Improved with lidocaine patch and Tylenol and Motrin  Continue to monitor

## 2025-05-24 NOTE — PLAN OF CARE
Problem: PAIN - ADULT  Goal: Verbalizes/displays adequate comfort level or baseline comfort level  Description: Interventions:  - Encourage patient to monitor pain and request assistance  - Assess pain using appropriate pain scale  - Administer analgesics as ordered based on type and severity of pain and evaluate response  - Implement non-pharmacological measures as appropriate and evaluate response  - Consider cultural and social influences on pain and pain management  - Notify physician/advanced practitioner if interventions unsuccessful or patient reports new pain  - Educate patient/family on pain management process including their role and importance of  reporting pain   - Provide non-pharmacologic/complimentary pain relief interventions  Outcome: Adequate for Discharge     Problem: INFECTION - ADULT  Goal: Absence or prevention of progression during hospitalization  Description: INTERVENTIONS:  - Assess and monitor for signs and symptoms of infection  - Monitor lab/diagnostic results  - Monitor all insertion sites, i.e. indwelling lines, tubes, and drains  - Monitor endotracheal if appropriate and nasal secretions for changes in amount and color  - [place] appropriate cooling/warming therapies per order  - Administer medications as ordered  - Instruct and encourage patient and family to use good hand hygiene technique  - Identify and instruct in appropriate isolation precautions for identified infection/condition  Outcome: Adequate for Discharge  Goal: Absence of fever/infection during neutropenic period  Description: INTERVENTIONS:  - Monitor WBC  - Perform strict hand hygiene  - Limit to healthy visitors only  - No plants, dried, fresh or silk flowers with cerda in patient room  Outcome: Adequate for Discharge     Problem: SAFETY ADULT  Goal: Patient will remain free of falls  Description: INTERVENTIONS:  - Educate patient/family on patient safety including physical limitations  - Instruct patient to call  for assistance with activity   - Consider consulting OT/PT to assist with strengthening/mobility based on AM PAC & JH-HLM score  - Consult OT/PT to assist with strengthening/mobility   - Keep Call bell within reach  - Keep bed low and locked with side rails adjusted as appropriate  - Keep care items and personal belongings within reach  - Initiate and maintain comfort rounds  Outcome: Adequate for Discharge  Goal: Maintain or return to baseline ADL function  Description: INTERVENTIONS:  -  Assess patient's ability to carry out ADLs; assess patient's baseline for ADL function and identify physical deficits which impact ability to perform ADLs (bathing, care of mouth/teeth, toileting, grooming, dressing, etc.)  - Assess/evaluate cause of self-care deficits   - Assess range of motion  - Assess patient's mobility; develop plan if impaired  - Assess patient's need for assistive devices and provide as appropriate  - Encourage maximum independence but intervene and supervise when necessary  - Involve family in performance of ADLs  - Assess for home care needs following discharge   - Consider OT consult to assist with ADL evaluation and planning for discharge  - Provide patient education as appropriate  - Monitor functional capacity and physical performance, use of AM PAC & JH-HLM   - Monitor gait, balance and fatigue with ambulation    Outcome: Adequate for Discharge  Goal: Maintains/Returns to pre admission functional level  Description: INTERVENTIONS:  - Perform AM-PAC 6 Click Basic Mobility/ Daily Activity assessment daily.  - Set and communicate daily mobility goal to care team and patient/family/caregiver.   - Collaborate with rehabilitation services on mobility goals if consulted  Outcome: Adequate for Discharge     Problem: DISCHARGE PLANNING  Goal: Discharge to home or other facility with appropriate resources  Description: INTERVENTIONS:  - Identify barriers to discharge w/patient and caregiver  - Arrange for  needed discharge resources and transportation as appropriate  - Identify discharge learning needs (meds, wound care, etc.)  - Arrange for interpretive services to assist at discharge as needed  - Refer to Case Management Department for coordinating discharge planning if the patient needs post-hospital services based on physician/advanced practitioner order or complex needs related to functional status, cognitive ability, or social support system  Outcome: Adequate for Discharge     Problem: Knowledge Deficit  Goal: Patient/family/caregiver demonstrates understanding of disease process, treatment plan, medications, and discharge instructions  Description: Complete learning assessment and assess knowledge base.  Interventions:  - Provide teaching at level of understanding  - Provide teaching via preferred learning methods  Outcome: Adequate for Discharge     Problem: POSTPARTUM  Goal: Experiences normal postpartum course  Description: INTERVENTIONS:  - Monitor maternal vital signs  - Assess uterine involution and lochia  Outcome: Adequate for Discharge  Goal: Appropriate maternal -  bonding  Description: INTERVENTIONS:  - Identify family support  - Assess for appropriate maternal/infant bonding   -Encourage maternal/infant bonding opportunities  - Referral to  or  as needed  Outcome: Adequate for Discharge  Goal: Establishment of infant feeding pattern  Description: INTERVENTIONS:  - Assess breast/bottle feeding  - Refer to lactation as needed  Outcome: Adequate for Discharge

## 2025-05-24 NOTE — LACTATION NOTE
This note was copied from a baby's chart.  CONSULT - LACTATION  Baby Girl Camacho (Kelly) 1 days female MRN: [id number]    [facility] AN NURSERY Room / Bed: (N)/(N) Encounter: [id number]    Maternal Information     MOTHER:  Lori Camacho  Maternal Age: 35 y.o.  OB History: # 1 - Date: 24, Sex: None, Weight: None, GA: 6w0d, Type: Spontaneous , Apgar1: None, Apgar5: None, Living: Fetal Demise, Birth Comments: None    # 2 - Date: 25, Sex: Female, Weight: 3525 g (7 lb 12.3 oz), GA: 39w0d, Type: Vaginal, Spontaneous, Apgar1: 9, Apgar5: 9, Living: Living, Birth Comments: None   Previous breast reduction surgery? No    Lactation history:   Has patient previously breast fed: No   How long had patient previously breast fed:     Previous breast feeding complications:       Past Surgical History:   Procedure Laterality Date    CERVICAL BIOPSY  W/ LOOP ELECTRODE EXCISION  Cryo surgery    2017    DENTAL SURGERY         Birth information:  YOB: 2025   Time of birth: 1:59 PM   Sex: female   Delivery type: Vaginal, Spontaneous   Birth Weight: 3525 g (7 lb 12.3 oz)   Percent of Weight Change: -1%     Gestational Age: 39w0d   [unfilled]    Reason for Consult:    Reason for Consult: Initial assessment (routine) - 10 min    Risk Factors:         Breast and nipple assessment:   Breasts/Nipples  Left Breast: Soft  Right Breast: Soft  Left Nipple: Everted  Right Nipple: Everted  Breastfeeding Status: Yes  Breastfeeding Progress: Not yet established    OB Lactation Tools:         Breast Pump:    Breast Pump  Pump: Personal       Assessment: normal assessment    Feeding assessment: feeding well  LATCH:  Latch: Too sleepy or reluctant, no latch achieved   Audible Swallowing: None   Type of Nipple: Everted (After stimulation)   Comfort (Breast/Nipple): Soft/non-tender   Hold (Positioning): Partial assist, teach one side, mother does other, staff  holds   LATCH Score: 5       Pranav:                   Feeding recommendations:  breast feed on demand    Met with Lori who is breastfeeding her baby girl. Baby girl is in the first 24 hours of life and has been having 5-10 minute feeds. Discussed  sleepiness and feeding/suckling patterns. Reviewed latch and positioning basics. Assisted in getting baby positioned in skin to skin on the right breast in cross cradle for a latch. Lori reports feeling nauseous with a headache. Encouraged to call for lactation support when feeling better.    Amilcar Orozco MA 2025 3:44 PM

## 2025-05-24 NOTE — PROGRESS NOTES
Progress Note - OB/GYN   Name: Lori Camacho 35 y.o. female I MRN: [id number]  Unit/Bed#: -01 I Date of Admission: 2025   Date of Service: 2025 I Hospital Day: 1    Assessment & Plan   (spontaneous vaginal delivery)  Continue routine post partum care  Pain well controlled: tylenol/motrin scheduled  Lochia within normal limits: continue to monitor   OOB: as able, encourage ambulation  Passing flatus  Voiding spontaneously  Contraception: undecided, considering depo  Anticipate d/c home tomorrow on PPD#2    Seropositive rheumatoid arthritis of multiple sites (HCC)  On Remicade since   Quantiferon gold neg 23  Neck pain  Consistent with muscle spasm  Improved with lidocaine patch and Tylenol and Motrin  Continue to monitor    OB Post-Partum Progress Note  Subjective   Post delivery. Patient is doing well. Lochia WNL. Pain well controlled. Patient endorsing mild paravertebral neck pain, worse with movement, improved with laying supine. Lidocaine patch in place with moderate relief.    Pain: yes, cramping, improved with meds  Tolerating PO: yes  Voiding: yes  Flatus: yes  Ambulating: yes  Breastfeeding:  yes  Chest pain: no  Shortness of breath: no  Leg pain: no  Lochia: WNL    Objective :  Temp:  [97.8 °F (36.6 °C)-98.2 °F (36.8 °C)] 98.1 °F (36.7 °C)  HR:  [] 59  BP: (103-164)/(52-99) 110/59  Resp:  [16-18] 16  SpO2:  [98 %-100 %] 98 %  O2 Device: None (Room air)    Physical Exam  Vitals reviewed.   HENT:      Head: Normocephalic and atraumatic.     Cardiovascular:      Rate and Rhythm: Normal rate.      Pulses: Normal pulses.   Pulmonary:      Effort: Pulmonary effort is normal. No respiratory distress.   Abdominal:      Palpations: Abdomen is soft.      Tenderness: There is no abdominal tenderness.     Musculoskeletal:         General: No swelling. Normal range of motion.     Neurological:      Mental Status: She is alert.     Psychiatric:         Mood and Affect: Mood normal.          Behavior: Behavior normal.           Lab Results: I have reviewed the following results:  Lab Results   Component Value Date    WBC 12.97 (H) 05/23/2025    HGB 11.3 (L) 05/23/2025    HCT 34.3 (L) 05/23/2025    MCV 95 05/23/2025     05/23/2025

## 2025-05-24 NOTE — ANESTHESIA PROCEDURE NOTES
Blood Patch:  Patient location during procedure: PACU  Start time: 5/24/2025 3:03 PM  Reason for Blood Patch: spinal headache  Preanesthetic Checklist:  Completed: patient identified, site marked, pre-op evaluation, timeout performed, IV checked, risks and benefits discussed, monitors and equipment checked and anesthesia consent given  Procedure Information:  Location of venous blood draw: arm  Volume of blood injected: 15 mL  Patient position: sitting  Prep: povidone-iodine  Patient monitoring: continuous pulse oximetry and blood pressure monitoring  Approach: midline  Injection technique: STEVE air  Location: L4-5  Needle and Epidural Catheter:  Injection method: Touhy needle  Needle gauge: 20G  Needle length: 10 cm  Loss of resistance depth: 6 cm    Assessment:  Events: well tolerated

## 2025-05-25 ENCOUNTER — ANESTHESIA (OUTPATIENT)
Dept: ANESTHESIOLOGY | Facility: HOSPITAL | Age: 35
End: 2025-05-25
Payer: COMMERCIAL

## 2025-05-25 ENCOUNTER — APPOINTMENT (OUTPATIENT)
Dept: CT IMAGING | Facility: HOSPITAL | Age: 35
End: 2025-05-25
Payer: COMMERCIAL

## 2025-05-25 ENCOUNTER — NURSE TRIAGE (OUTPATIENT)
Dept: OTHER | Facility: OTHER | Age: 35
End: 2025-05-25

## 2025-05-25 ENCOUNTER — ANESTHESIA EVENT (OUTPATIENT)
Dept: ANESTHESIOLOGY | Facility: HOSPITAL | Age: 35
End: 2025-05-25
Payer: COMMERCIAL

## 2025-05-25 ENCOUNTER — HOSPITAL ENCOUNTER (OUTPATIENT)
Facility: HOSPITAL | Age: 35
Discharge: HOME | End: 2025-05-25
Attending: OBSTETRICS & GYNECOLOGY | Admitting: OBSTETRICS & GYNECOLOGY
Payer: COMMERCIAL

## 2025-05-25 VITALS
TEMPERATURE: 98 F | SYSTOLIC BLOOD PRESSURE: 110 MMHG | HEART RATE: 68 BPM | DIASTOLIC BLOOD PRESSURE: 68 MMHG | OXYGEN SATURATION: 100 % | RESPIRATION RATE: 18 BRPM

## 2025-05-25 DIAGNOSIS — G97.1 SPINAL HEADACHE: Primary | ICD-10-CM

## 2025-05-25 PROCEDURE — 96360 HYDRATION IV INFUSION INIT: CPT

## 2025-05-25 PROCEDURE — 70450 CT HEAD/BRAIN W/O DYE: CPT

## 2025-05-25 PROCEDURE — 99213 OFFICE O/P EST LOW 20 MIN: CPT

## 2025-05-25 PROCEDURE — 96374 THER/PROPH/DIAG INJ IV PUSH: CPT

## 2025-05-25 RX ORDER — KETOROLAC TROMETHAMINE 10 MG/1
10 TABLET, FILM COATED ORAL EVERY 6 HOURS PRN
Qty: 12 TABLET | Refills: 0 | Status: SHIPPED | OUTPATIENT
Start: 2025-05-25

## 2025-05-25 RX ORDER — KETOROLAC TROMETHAMINE 30 MG/ML
30 INJECTION, SOLUTION INTRAMUSCULAR; INTRAVENOUS ONCE
Status: COMPLETED | OUTPATIENT
Start: 2025-05-25 | End: 2025-05-25

## 2025-05-25 RX ADMIN — KETOROLAC TROMETHAMINE 30 MG: 30 INJECTION, SOLUTION INTRAMUSCULAR; INTRAVENOUS at 15:40

## 2025-05-25 RX ADMIN — SODIUM CHLORIDE, SODIUM LACTATE, POTASSIUM CHLORIDE, AND CALCIUM CHLORIDE 1000 ML: .6; .31; .03; .02 INJECTION, SOLUTION INTRAVENOUS at 15:34

## 2025-05-25 NOTE — TELEPHONE ENCOUNTER
"Regarding: Post Delivery / Severe headaches  ----- Message from Earline MONROY sent at 5/25/2025 11:08 AM EDT -----  \"I just delievered 2 days ago and was sent home yesterday. Yesterday I did have a blood patch because of epidermal. And everything was fine when I got home  yesterday and then last night when feeding it started again the severe headaches. It hurts in the back of head & neck and I can't sit up. I didn't know what to do, I just wanted to speak to someone\"    "

## 2025-05-25 NOTE — TELEPHONE ENCOUNTER
"FOLLOW UP: None at this time    REASON FOR CONVERSATION: Headache    SYMPTOMS: Spinal headache     OTHER: On call provider recommended patient to present to L&D for eval. Patient made aware and verbalized understanding.     DISPOSITION: Go to LD Now (overriding Go to ED Now (or PCP Triage))        Reason for Disposition   [1] SEVERE headache AND [2] sudden-onset (i.e., reaching maximum intensity within seconds to 1 hour)    Answer Assessment - Initial Assessment Questions  1. LOCATION: \"Where does it hurt?\"         Back of head and base of neck      2. ONSET: \"When did the headache start?\" (Minutes, hours or days)         Started again last night     3. PATTERN: \"Does the pain come and go, or has it been constant since it started?\"        Okay when laying down or sitting but worse when standing     4. SEVERITY: \"How bad is the pain?\" and \"What does it keep you from doing?\"         9/10 at it's worst     5. RECURRENT SYMPTOM: \"Have you ever had headaches before?\" If Yes, ask: \"When was the last time?\" and \"What happened that time?\"         Yes happened while patient was in the hospital and received a blood patch epidural which resolved pain     6. CAUSE: \"What do you think is causing the headache?\"        Spinal headache     7. MIGRAINE: \"Have you been diagnosed with migraine headaches?\" If Yes, ask: \"Is this headache similar?\"         N/A    8. PREECLAMPSIA - HYPERTENSION:  \"Were you diagnosed with preeclampsia during your pregnancy?\"          Denies     9. HEAD INJURY: \"Has there been any recent injury to the head?\"         Denies     10. OTHER SYMPTOMS: \"Do you have any other symptoms?\" (e.g., fever, stiff neck, blurred vision; swelling of hands, face, or feet)          Pain in base of neck and head, denies changes in vision, LH/dizziness, denies other symptoms     11. DELIVERY DATE: \"When was your delivery date?\" \"Vaginal delivery or ?\" \"Did you have a spinal (epidural) anesthesia during childbirth?\"     "      25     Protocols used: Postpartum - Headache-Adult-AH

## 2025-05-25 NOTE — ANESTHESIA PROCEDURE NOTES
Blood Patch:  Patient location during procedure: Holding area  Start time: 5/25/2025 2:16 PM  Reason for Blood Patch: spinal headache  Preanesthetic Checklist:  Completed: patient identified, site marked, surgical consent, pre-op evaluation, timeout performed, IV checked, risks and benefits discussed, monitors and equipment checked and anesthesia consent given  Procedure Information:  Location of venous blood draw: arm  Volume of blood injected: 20 mL  Patient position: sitting  Prep: Chloraprep  Patient monitoring: continuous pulse oximetry  Approach: midline  Injection technique: STEVE saline  Location: L3-4  Needle and Epidural Catheter:  Injection method: Touhy needle  Needle gauge: 17G  Needle length: 10 cm  Loss of resistance depth: 5 cm    Assessment:  Events: well tolerated

## 2025-05-25 NOTE — PROGRESS NOTES
Upon d/c of piv patients left AC began to swell around the IV site. Dr thomson aware and dr marks called to assess. Pressure dressing applied and advised to apply a warm compress to the area until it goes down.

## 2025-05-25 NOTE — QUICK NOTE
CT head without abnormalities. Headache feeling better per pt. She desires discharge home. Will send with PO Toradol. Attending physician Dr. Medeiros aware.    Nguyễn Lmia MD  OBGYN PGY-1  05/25/25  7:23 PM

## 2025-05-27 ENCOUNTER — NURSE TRIAGE (OUTPATIENT)
Age: 35
End: 2025-05-27

## 2025-05-27 NOTE — TELEPHONE ENCOUNTER
Over the weekend, we spoke with the anesthesia team. They would not recommend another blood patch. She had CrT scan which was negative. They said f/u with PCP and possible referral to neurology/neurosurgery. So I would recommend she make appt with PCP to start. She should also make sure she's getting caffeine as it can help.

## 2025-05-27 NOTE — UTILIZATION REVIEW
"Mother and baby discharged 2025    NOTIFICATION OF INPATIENT ADMISSION   MATERNITY/DELIVERY AUTHORIZATION REQUEST   SERVICING FACILITY:   [facility] - L&D, [place], NICU  [address] [place] [address]  Tax ID: [id number]  NPI: [id number]   ATTENDING PROVIDER:  Attending Name and NPI#: Brisa Medeiros Do [[id number]]  Address: [address] [place] [address]  Phone: [phone]   ADMISSION INFORMATION:  Place of Service: Inpatient [facility]  Place of Service Code: 21  Inpatient Admission Date/Time: 25  7:43 AM  Discharge Date/Time: 2025  6:30 PM  Admitting Diagnosis Code/Description:  Encounter for full-term uncomplicated delivery [O80]     Mother: Lori Camacho 1990 Estimated Date of Delivery: 25  Delivering clinician: Brisa Medeiros   OB History          2    Para   1    Term   1       0    AB   1    Living   1         SAB   1    IAB   0    Ectopic   0    Multiple   0    Live Births   1           Obstetric Comments   Menarche 12               [place] Name & MRN:   Information for the patient's :  Janice Kenyetta Hong [[id number]]   [place] Delivery Information:  Sex: female  Delivered 2025 1:59 PM by Vaginal, Spontaneous; Gestational Age: 39w0d    [place] Measurements:  Weight: 7 lb 12.3 oz (3525 g);  Height: 19.5\"    APGAR 1 minute 5 minutes 10 minutes   Totals: 9 9       UTILIZATION REVIEW CONTACT:  Suellen Berry Utilization   Network Utilization Review Department  Phone: [phone]  Fax [phone]  Email: [email or web address]  Contact for approvals/pending authorizations, clinical reviews, and discharge.     PHYSICIAN ADVISORY SERVICES:  Medical Necessity Denial & Trko-dp-Bqir Review  Phone: [phone]  Fax: [phone]  Email: [email or web address]     DISCHARGE SUPPORT TEAM:  For Patients Discharge Needs & Updates  Phone: " [phone] opt. 2 Fax: [phone]  Email: [email or web address]

## 2025-05-27 NOTE — TELEPHONE ENCOUNTER
"REASON FOR CONVERSATION: Headache    SYMPTOMS: headache at base of head, neck     OTHER HEALTH INFORMATION:  . Had blood patch for spinal headache on  and again on  in ED.  She reports feeling better after second blood patch, but headache returned yesterday afternoon.  \"Ok\" when laying flat, 9/10 pressure in back of head and neck with sitting and standing.  Patient has been following directions to rest and stay hydrated, taking Toradol and tylenol    PROTOCOL DISPOSITION: Discuss with Provider and Call Back Patient    CARE ADVICE PROVIDED: continue hydration, rest, meds prescribed    PRACTICE FOLLOW-UP: message to provider.    Answer Assessment - Initial Assessment Questions  1. LOCATION: \"Where does it hurt?\"       Base of skull  2. ONSET: \"When did the headache start?\" (Minutes, hours or days)       Yesterday afternoon  3. PATTERN: \"Does the pain come and go, or has it been constant since it started?\"      Upon position changes  4. SEVERITY: \"How bad is the pain?\" and \"What does it keep you from doing?\"       9/10  5. RECURRENT SYMPTOM: \"Have you ever had headaches before?\" If Yes, ask: \"When was the last time?\" and \"What happened that time?\"       Yes- had blood patch  and   6. CAUSE: \"What do you think is causing the headache?\"      Spinal headache    8. PREECLAMPSIA - HYPERTENSION:  \"Were you diagnosed with preeclampsia during your pregnancy?\"        no  9. HEAD INJURY: \"Has there been any recent injury to the head?\"       N/a  10. OTHER SYMPTOMS: \"Do you have any other symptoms?\" (e.g., fever, stiff neck, blurred vision; swelling of hands, face, or feet)        denies  11. DELIVERY DATE: \"When was your delivery date?\" \"Vaginal delivery or ?\" \"Did you have a spinal (epidural) anesthesia during childbirth?\"        25    Protocols used: Postpartum - Headache-Adult-AH    "

## 2025-05-28 ENCOUNTER — TELEPHONE (OUTPATIENT)
Age: 35
End: 2025-05-28

## 2025-05-28 ENCOUNTER — TELEPHONE (OUTPATIENT)
Dept: POSTPARTUM | Facility: CLINIC | Age: 35
End: 2025-05-28

## 2025-05-28 NOTE — TELEPHONE ENCOUNTER
Continue to nurse Kenyetta on demand. Ensure she has a deep latch and is effectively drinking. An additional supplement of 2-3 oz is a very big volume for a 5 day old baby, I'd recommended after nursing offering her some supplement if she seems hungry or had a more sleepy feeding, but allow her to take what she wants. We do want her to keep waking up to nurse to establish your production.     It is okay for you to position her however is comfortable for you. Make sure she is close against you, has good ear, shoulder, hip body alignment , and is reaching up to latch with a wide mouth. She should have a wide, comfortable latch and you should hear swallows for most of the feeding. Your breasts should feel softer after nursing.     It is okay to pump if needed to provide her with additional breast milk instead of formula. 2 oz for 5 days postpartum is great! No need to pump with every feeding, just if she is more sleepy or your breasts become uncomfortably full.     Please call as needed for support until our scheduled visit!

## 2025-05-28 NOTE — TELEPHONE ENCOUNTER
Spoke with Lori to schedule lactation visit - scheduled for 6/10 in .  She asked for a call from  to help answer a question that she has till she can be seen.

## 2025-05-28 NOTE — TELEPHONE ENCOUNTER
Earline calling from [facility] , asking for patients delivery date. No information given as no DEBORA on chart. Earline will fax over DEBORA to office .    Fax: [phone]

## 2025-05-30 ENCOUNTER — OFFICE VISIT (OUTPATIENT)
Dept: FAMILY MEDICINE CLINIC | Facility: CLINIC | Age: 35
End: 2025-05-30
Payer: COMMERCIAL

## 2025-05-30 ENCOUNTER — TELEPHONE (OUTPATIENT)
Age: 35
End: 2025-05-30

## 2025-05-30 VITALS
DIASTOLIC BLOOD PRESSURE: 58 MMHG | OXYGEN SATURATION: 99 % | TEMPERATURE: 97.5 F | WEIGHT: 140 LBS | HEART RATE: 80 BPM | SYSTOLIC BLOOD PRESSURE: 100 MMHG | HEIGHT: 67 IN | BODY MASS INDEX: 21.97 KG/M2

## 2025-05-30 DIAGNOSIS — G97.1 SPINAL HEADACHE: Primary | ICD-10-CM

## 2025-05-30 PROCEDURE — 99214 OFFICE O/P EST MOD 30 MIN: CPT

## 2025-05-30 RX ORDER — BUTALBITAL, ACETAMINOPHEN AND CAFFEINE 300; 40; 50 MG/1; MG/1; MG/1
1 CAPSULE ORAL EVERY 6 HOURS PRN
Qty: 30 CAPSULE | Refills: 0 | Status: SHIPPED | OUTPATIENT
Start: 2025-05-30

## 2025-05-30 NOTE — TELEPHONE ENCOUNTER
Patient scheduled for 7/3/2025 in our [place] Office     Scheduled as Urgent and placed on high priority wait list with multiple providers

## 2025-05-30 NOTE — PROGRESS NOTES
Name: Lori Camacho      : 1990      MRN: [id number]  Encounter Provider: Peri Tellez DO  Encounter Date: 2025   Encounter department: [facility]    Assessment & Plan  Spinal headache    Orders:  •  Ambulatory Referral to Neurology; Future         History of Present Illness   {?Quick Links Encounters * My Last Note * Last Note in Specialty * Snapshot * Since Last Visit * History :53234}  Patient presents for acute visit for spinal headache. Had blood patches on  and . CT head on  was negative.     Trying to drink coffee, caffinated drinks. Pain bvetter than 1 week ago. Pain is gradually getting better. Pain is in back of skull. Relieved immediately with laying down.       No changes in vision, blurriness, weakness, taking toradol over the past few days, stopped taking it yesterday. Can't tell if toradol made a difference.          Headache    Review of Systems   Neurological:  Positive for headaches.     Past Medical History[1]  Past Surgical History[2]  Family History[3]  Social History[4]  Medications[5]  Allergies   Allergen Reactions   • Amoxicillin Other (See Comments)     Childhood allergy    • Other Other (See Comments)     Seasonal- Runny nose/ congestion      Immunization History   Administered Date(s) Administered   • COVID-19 MODERNA VACC 0.5 ML IM 2020, 2021, 2021   • DTaP 5 1990, 1990, 1990, 1991, 1994   • HPV9 2020, 2020, 2021   • Hep B, Adolescent or Pediatric 1993   • Hep B, adult 1993, 1993, 1994   • Hib (PRP-OMP) 1990, 1991, 1991   • INFLUENZA 10/06/2010, 2011, 10/12/2015, 10/31/2017, 10/30/2018   • IPV 1990, 1990, 1990, 1991, 1994   • Influenza Quadrivalent 3 years and older 10/30/2018   • Influenza Quadrivalent, 6-35 Months IM 10/31/2017   • Influenza, seasonal, injectable 10/06/2010, 2011   •  "MMR 06/24/1991, 04/23/1997   • Meningococcal MCV4P 03/31/2006   • Meningococcal, Unknown Serogroups 03/31/2006   • Td (adult), adsorbed 04/21/2000   • Tdap 03/31/2006, 07/05/2018, 03/14/2025   • Varicella 06/06/1995, 06/06/2005   • influenza, injectable, quadrivalent 10/31/2017     Objective {?Quick Links Trend Vitals * Enter New Vitals * Results Review * Timeline (Adult) * Labs * Imaging * Cardiology * Procedures * Lung Cancer Screening * Surgical eConsent :10501}  /58 (BP Location: Left arm, Patient Position: Lying left side, Cuff Size: Adult)   Pulse 80   Temp 97.5 °F (36.4 °C) (Tympanic)   Ht 5' 7\" (1.702 m)   Wt 63.5 kg (140 lb)   LMP 08/30/2024 (Exact Date)   SpO2 99%   BMI 21.93 kg/m²     Physical Exam  {Administrative / Billing Section (Optional):44356}         [1]  Past Medical History:  Diagnosis Date   • Abnormal Pap smear of cervix    • Asthma     exercise induced   • ANDREA III (cervical intraepithelial neoplasia grade III) with severe dysplasia 06/26/2019   • Exercise-induced asthma    • HPV (human papilloma virus) infection    • Pregnancy 11/12/2024   • RA (rheumatoid arthritis) ([facility])    • Seasonal allergies    • Varicella     Vaccine   [2]  Past Surgical History:  Procedure Laterality Date   • CERVICAL BIOPSY  W/ LOOP ELECTRODE EXCISION  Cryo surgery    2017   • DENTAL SURGERY     [3]  Family History  Problem Relation Name Age of Onset   • No Known Problems Mother     • No Known Problems Father     • No Known Problems Sister     • No Known Problems Sister     • No Known Problems Maternal Grandmother     • Other Niece          congenital hypothyroidism   [4]  Social History  Tobacco Use   • Smoking status: Never   • Smokeless tobacco: Never   Vaping Use   • Vaping status: Never Used   Substance and Sexual Activity   • Alcohol use: Yes     Alcohol/week: 1.0 standard drink of alcohol     Types: 1 Glasses of wine per week     Comment: occasional   • Drug use: No   • Sexual activity: Yes     " Partners: Male     Birth control/protection: Injection   [5]  Current Outpatient Medications on File Prior to Visit   Medication Sig   • acetaminophen (TYLENOL) 160 mg/5 mL suspension Take 20.3 mL (650 mg total) by mouth every 6 (six) hours   • CALCIUM PO Take 2,000 mg by mouth in the morning.   • Cholecalciferol (Vitamin D) 125 MCG (5000 UT) CAPS Take by mouth in the morning.   • ketorolac (TORADOL) 10 mg tablet Take 1 tablet (10 mg total) by mouth every 6 (six) hours as needed for moderate pain   • polyethylene glycol (MIRALAX) 17 g packet Take 17 g by mouth daily   • witch hazel-glycerin (TUCKS) topical pad Apply 1 Pad topically every 4 (four) hours as needed for irritation   • albuterol (PROVENTIL HFA,VENTOLIN HFA) 90 mcg/act inhaler Inhale 2 puffs every 4 (four) hours as needed for wheezing (Patient not taking: Reported on 5/30/2025)   • benzocaine-menthol-lanolin-aloe (DERMOPLAST) 20-0.5 % topical spray Apply 1 Application topically every 6 (six) hours as needed for mild pain or irritation (Patient not taking: Reported on 5/30/2025)   • hydrocortisone 1 % cream Apply 1 Application topically daily as needed for irritation or rash (Patient not taking: Reported on 5/30/2025)   • ibuprofen (MOTRIN) 100 mg/5 mL suspension Take 30 mL (600 mg total) by mouth every 6 (six) hours (Patient not taking: Reported on 5/30/2025)   • lidocaine (LIDODERM) 5 % Apply 1 patch topically daily over 12 hours Remove & Discard patch within 12 hours or as directed by MD (Patient not taking: Reported on 5/30/2025)   • Prenatal Vit-Fe Fumarate-FA (PRENATAL VITAMIN PO) Take 1 tablet by mouth in the morning (Patient not taking: Reported on 5/30/2025)   • simethicone (MYLICON) 80 mg chewable tablet Chew 1 tablet (80 mg total) 4 (four) times a day as needed for flatulence (Patient not taking: Reported on 5/30/2025)

## 2025-05-30 NOTE — ASSESSMENT & PLAN NOTE
-neuro exam unremarkable today although patient is in a great deal of pain and prefers to be laying down   -CT Head on 5/25: no acute intracranial abnormality    Plan:  Placed STAT referral to neeurology today, reached out to neurology and they will try to schedule patient for emergency appointment today  Can take prn fioricet, advised not to breastfeed while taking   ED precautions for neurological deficiencies, worsening pain    Orders:    Ambulatory Referral to Neurology; Future    Butalbital-APAP-Caffeine (Fioricet) -40 MG CAPS; Take 1 tablet by mouth every 6 (six) hours as needed (headache) (Patient not taking: Reported on 6/10/2025)

## 2025-05-30 NOTE — TELEPHONE ENCOUNTER
Dr. Peri Tellez calling with patient currently in office, requesting patient be seen ASAP, referral placed also, spinal headaches.    Patient is s/p vaginal delivery 5/23 with epidural, then blood patch  needed x 2 on 5/24, 5/25 to treat spinal headaches with immediate relief however headache recurs, currently debilitating 9/10,  denies dizziness , denies vomiting, denies nausea, PCP reports neuro exam unremarkable , patient received toradol and PCP will prescribe fioricet; reports gradually getting a little better, feels better when lying flat; asking to be seen ASAP; PcP does not feel patient needs to be seen in ED today.    Please call patient and assist in scheduling, thanks for your help.

## 2025-05-30 NOTE — PROGRESS NOTES
Name: Lori Camacho      : 1990      MRN: [id number]  Encounter Provider: Peri Tellez DO  Encounter Date: 2025   Encounter department: [facility]    Assessment & Plan  Spinal headache  -neuro exam unremarkable today although patient is in a great deal of pain and prefers to be laying down   -CT Head on : no acute intracranial abnormality    Plan:  Placed STAT referral to neeurology today, reached out to neurology and they will try to schedule patient for emergency appointment today  Can take prn fioricet, advised not to breastfeed while taking   ED precautions for neurological deficiencies, worsening pain    Orders:    Ambulatory Referral to Neurology; Future    Butalbital-APAP-Caffeine (Fioricet) -40 MG CAPS; Take 1 tablet by mouth every 6 (six) hours as needed (headache) (Patient not taking: Reported on 6/10/2025)         History of Present Illness     Patient presents for acute visit. Gave birth to daughter on  and had epidural. Since epidural has been having intense spinal headaches. Had blood patches on  and . CT head on  was negative.     Trying to drink coffee, caffinated drinks. Pain bvetter than 1 week ago. Pain is gradually getting better. Pain is in back of skull. Relieved immediately with laying down.     No changes in vision, blurriness, weakness, taking toradol over the past few days, stopped taking it yesterday. Can't tell if toradol made a difference.          Headache    Review of Systems   Constitutional:  Negative for chills and fever.   HENT:  Negative for ear pain and sore throat.    Eyes:  Negative for pain and visual disturbance.   Respiratory:  Negative for cough and shortness of breath.    Cardiovascular:  Negative for chest pain and palpitations.   Gastrointestinal:  Negative for abdominal pain and vomiting.   Genitourinary:  Negative for dysuria and hematuria.   Musculoskeletal:  Negative for arthralgias and back pain.  "  Skin:  Negative for color change and rash.   Neurological:  Positive for headaches. Negative for seizures and syncope.   All other systems reviewed and are negative.    Past Medical History[1]  Past Surgical History[2]  Family History[3]  Social History[4]  Medications[5]  Allergies   Allergen Reactions    Amoxicillin Other (See Comments)     Childhood allergy     Other Other (See Comments)     Seasonal- Runny nose/ congestion      Immunization History   Administered Date(s) Administered    COVID-19 MODERNA VACC 0.5 ML IM 12/23/2020, 01/20/2021, 12/07/2021    DTaP 5 1990, 1990, 1990, 09/16/1991, 11/01/1994    HPV9 07/07/2020, 09/08/2020, 02/23/2021    Hep B, Adolescent or Pediatric 11/20/1993    Hep B, adult 01/05/1993, 11/20/1993, 05/24/1994    Hib (PRP-OMP) 1990, 03/25/1991, 06/24/1991    INFLUENZA 10/06/2010, 11/11/2011, 10/12/2015, 10/31/2017, 10/30/2018    IPV 1990, 1990, 1990, 09/16/1991, 11/01/1994    Influenza Quadrivalent 3 years and older 10/30/2018    Influenza Quadrivalent, 6-35 Months IM 10/31/2017    Influenza, seasonal, injectable 10/06/2010, 11/11/2011    MMR 06/24/1991, 04/23/1997    Meningococcal MCV4P 03/31/2006    Meningococcal, Unknown Serogroups 03/31/2006    Td (adult), adsorbed 04/21/2000    Tdap 03/31/2006, 07/05/2018, 03/14/2025    Varicella 06/06/1995, 06/06/2005    influenza, injectable, quadrivalent 10/31/2017     Objective   /58 (BP Location: Left arm, Patient Position: Lying left side, Cuff Size: Adult)   Pulse 80   Temp 97.5 °F (36.4 °C) (Tympanic)   Ht 5' 7\" (1.702 m)   Wt 63.5 kg (140 lb)   LMP 08/30/2024 (Exact Date)   SpO2 99%   BMI 21.93 kg/m²     Physical Exam  Constitutional:       General: She is not in acute distress.     Appearance: Normal appearance. She is ill-appearing (uncomfortable, laying down in fetal position on exam table). She is not toxic-appearing.   HENT:      Head: Normocephalic and atraumatic.      Right " Ear: External ear normal.      Left Ear: External ear normal.      Nose: Nose normal.     Eyes:      Conjunctiva/sclera: Conjunctivae normal.       Cardiovascular:      Rate and Rhythm: Normal rate and regular rhythm.      Heart sounds: Normal heart sounds. No murmur heard.  Pulmonary:      Effort: Pulmonary effort is normal. No respiratory distress.      Breath sounds: Normal breath sounds. No wheezing, rhonchi or rales.     Musculoskeletal:         General: Normal range of motion.     Skin:     General: Skin is warm and dry.     Neurological:      General: No focal deficit present.      Mental Status: She is alert and oriented to person, place, and time.      Cranial Nerves: No cranial nerve deficit.      Sensory: No sensory deficit.      Motor: No weakness.      Coordination: Coordination normal.     Psychiatric:         Mood and Affect: Mood normal.         Behavior: Behavior normal.                [1]   Past Medical History:  Diagnosis Date    Abnormal Pap smear of cervix     Asthma     exercise induced    ANDREA III (cervical intraepithelial neoplasia grade III) with severe dysplasia 06/26/2019    Exercise-induced asthma     HPV (human papilloma virus) infection     Pregnancy 11/12/2024    RA (rheumatoid arthritis) ([facility])     Seasonal allergies     Varicella     Vaccine   [2]   Past Surgical History:  Procedure Laterality Date    CERVICAL BIOPSY  W/ LOOP ELECTRODE EXCISION  Cryo surgery    2017    DENTAL SURGERY     [3]   Family History  Problem Relation Name Age of Onset    No Known Problems Mother      No Known Problems Father      No Known Problems Sister      No Known Problems Sister      No Known Problems Maternal Grandmother      Other Niece          congenital hypothyroidism   [4]   Social History  Tobacco Use    Smoking status: Never    Smokeless tobacco: Never   Vaping Use    Vaping status: Never Used   Substance and Sexual Activity    Alcohol use: Not Currently     Alcohol/week: 1.0 standard drink of  alcohol     Types: 1 Glasses of wine per week     Comment: occasional    Drug use: No    Sexual activity: Not Currently     Partners: Male     Birth control/protection: Injection   [5]   Current Outpatient Medications on File Prior to Visit   Medication Sig    acetaminophen (TYLENOL) 160 mg/5 mL suspension Take 20.3 mL (650 mg total) by mouth every 6 (six) hours    CALCIUM PO Take 2,000 mg by mouth in the morning.    Cholecalciferol (Vitamin D) 125 MCG (5000 UT) CAPS Take by mouth in the morning.    ketorolac (TORADOL) 10 mg tablet Take 1 tablet (10 mg total) by mouth every 6 (six) hours as needed for moderate pain (Patient not taking: Reported on 6/10/2025)    polyethylene glycol (MIRALAX) 17 g packet Take 17 g by mouth daily (Patient not taking: Reported on 6/10/2025)    witch hazel-glycerin (TUCKS) topical pad Apply 1 Pad topically every 4 (four) hours as needed for irritation (Patient not taking: Reported on 6/10/2025)    albuterol (PROVENTIL HFA,VENTOLIN HFA) 90 mcg/act inhaler Inhale 2 puffs every 4 (four) hours as needed for wheezing (Patient not taking: Reported on 6/10/2025)    benzocaine-menthol-lanolin-aloe (DERMOPLAST) 20-0.5 % topical spray Apply 1 Application topically every 6 (six) hours as needed for mild pain or irritation (Patient not taking: Reported on 6/10/2025)    hydrocortisone 1 % cream Apply 1 Application topically daily as needed for irritation or rash (Patient not taking: Reported on 6/10/2025)    ibuprofen (MOTRIN) 100 mg/5 mL suspension Take 30 mL (600 mg total) by mouth every 6 (six) hours (Patient not taking: Reported on 6/10/2025)    lidocaine (LIDODERM) 5 % Apply 1 patch topically daily over 12 hours Remove & Discard patch within 12 hours or as directed by MD (Patient not taking: Reported on 6/10/2025)    Prenatal Vit-Fe Fumarate-FA (PRENATAL VITAMIN PO) Take 1 tablet by mouth in the morning (Patient not taking: Reported on 5/30/2025)    simethicone (MYLICON) 80 mg chewable tablet  Chew 1 tablet (80 mg total) 4 (four) times a day as needed for flatulence (Patient not taking: Reported on 6/10/2025)

## 2025-06-03 ENCOUNTER — TELEPHONE (OUTPATIENT)
Dept: OBGYN CLINIC | Facility: CLINIC | Age: 35
End: 2025-06-03

## 2025-06-03 ENCOUNTER — TELEPHONE (OUTPATIENT)
Dept: ENDOCRINOLOGY | Facility: CLINIC | Age: 35
End: 2025-06-03

## 2025-06-03 NOTE — TELEPHONE ENCOUNTER
Patient was called and discussed the following,    How are you and baby doing in postpartum? Getting Better, Spinal headache. Baby well.    How did the delivery go? Good, happened quick.    Do you still have bleeding/pain? If so, how much/how severe? Little bleeding subsiding, No pain.    Regular BMs/Urination? Yes    Have you made your postpartum appointment yet? 6 week follow up.     How is feeding going? Are you breastfeeding bottle or both? Breastfeeding at first now supplementing and pumping. Latches sometimes.  Lactation appointment next Tuesday.     Has baby seen the pediatrician? Yes weight checks and baby gaining weight.    How are you doing emotionally? Doing Better now that headaches are gone.    Patient denies any questions or concerns at this point.  Patient reminded to call office for any questions or concerns.Patient verbalized understanding.         KYRIE Farmer  OB Nurse Navigator

## 2025-06-04 DIAGNOSIS — E05.90 HYPERTHYROIDISM AFFECTING PREGNANCY IN THIRD TRIMESTER: Primary | ICD-10-CM

## 2025-06-04 DIAGNOSIS — R79.89 ABNORMAL TSH: ICD-10-CM

## 2025-06-04 DIAGNOSIS — O99.283 HYPERTHYROIDISM AFFECTING PREGNANCY IN THIRD TRIMESTER: Primary | ICD-10-CM

## 2025-06-10 ENCOUNTER — OFFICE VISIT (OUTPATIENT)
Age: 35
End: 2025-06-10
Payer: COMMERCIAL

## 2025-06-10 PROCEDURE — 99404 PREV MED CNSL INDIV APPRX 60: CPT | Performed by: PEDIATRICS

## 2025-06-10 NOTE — PROGRESS NOTES
INITIAL BREAST FEEDING EVALUATION    Informant/Relationship: Lori (mom/self)     Discussion of General Lactation Issues: Family went to the Ped yesterday and was recommended for baby to have increased calories, now giving fortified breast milk. Within the past 2 days baby does seem to be more active and eating more efficiently. Prior to that she was very sleepy for most of the time.     Plan for follow up with the Ped on Monday for check in and weigh in.     Infant, Kenyetta, is 18 days old today.        History:  Fertility Problem:no  Breast changes:yes - enlargement, tenderness   : yes - epidural, did not need pitocin, 10 hours of labor. Pushed for a little over 2 hours  Full term:yes - 39 weeks    labor:no  First nursing/attempt < 1 hour after birth:yes - this was a good latch per Mom   Skin to skin following delivery:yes - immediately   Breast changes after delivery:yes - 4-5 days postpartum   Rooming in (infant in room with mother with exception of procedures, eg. Circumcision: yes - entire stay   Blood sugar issues:no  NICU stay:no  Jaundice:no  Phototherapy:no  Supplement given: (list supplement and method used as well as reason(s):started at the first Ped visit.     Past Medical History[1]    Current Medications[2]    Allergies   Allergen Reactions    Amoxicillin Other (See Comments)     Childhood allergy     Other Other (See Comments)     Seasonal- Runny nose/ congestion        Social History     Substance and Sexual Activity   Drug Use No       Social History     Interval Breastfeeding History:    Frequency of breast feeding: mostly for comfort if directly, 3-4 times per day   Does mother feel breastfeeding is effective: No - she gets sleepy quickly   Does infant appear satisfied after nursing:No - sleepy   Stooling pattern normal: Yes  Urinating frequently:Yes  Using shield or shells: No    Alternative/Artificial Feedings:   Bottle: Yes, Chip Connor, level 2 flow rate   Cup:  No  Syringe/Finger: No           Formula Type: Enfamil powder added to 2 oz of breast milk                       Amount: 2 oz             Breast Milk:                      Amount: 2 oz             Frequency Q 2-3 Hr between feedings, waking for most feedings   Elimination Problems: No    Was taking 45-60 min to take a bottle, now 20-30 minutes       Equipment:  Nipple Shield             Type: no             Size: n/a             Frequency of Use: n/a  Pump            Type: Spectra S2             Frequency of Use: every 2-3 hours  Shells            Type: no            Frequency of use: n/a    Equipment Problems: no    Mom:  Breast: Normal  Nipple Assessment in General: Normal: elongated/eraser, no discoloration and no damage noted.  Mother's Awareness of Feeding Cues                 Recognizes: Yes                  Verbalizes: Yes  Support System: great support, FOB, parents, in-laws   History of Breastfeeding: first time breastfeeding   Changes/Stressors/Violence: Spinal headache after baby was born, difficulty for about 1 week with positioning and movement in general. Baby Kenyetta is not gaining weight well, increasing calories.   Concerns/Goals: Lori desires to make sure she is doing things correctly, she desires to do more direct breastfeeding.     Problems with Mom: none     Physical Exam  Constitutional:       Appearance: Normal appearance.   HENT:      Head: Normocephalic.   Pulmonary:      Effort: Pulmonary effort is normal.     Musculoskeletal:         General: Normal range of motion.      Cervical back: Normal range of motion.     Neurological:      General: No focal deficit present.      Mental Status: She is alert and oriented to person, place, and time.     Skin:     General: Skin is warm.      Capillary Refill: Capillary refill takes less than 2 seconds.     Psychiatric:         Mood and Affect: Mood normal.         Behavior: Behavior normal.         Thought Content: Thought content normal.          Judgment: Judgment normal.       Infant:  Behaviors: Alert  Color: Pink, dusky appearance at times   Birth weight: 3525 g   Current weight: 3480 g     Problems with infant: chompy, tight upper lip       General Appearance:  Alert, active, no distress                            Head:  Normocephalic, AFOF, sutures opposed                            Eyes:   Conjunctiva clear, no drainage                            Ears:   Normally placed, no anomolies                           Nose:   Septum intact, no drainage or erythema                          Mouth:  No lesions. Tongue extends past her lip, body of tongue lateralizes, tip is midline and notched tip noted in any direction. Labial frenulum inserts into anterior papilla, difficult to flange upper lip and frenulum blanches when lifted. Tongue is v-shaped on manual lifts, speed bump palpable under tongue.                     Neck:  Supple, symmetrical, trachea midline                Respiratory:  No grunting, flaring, retractions, breath sounds clear and equal           Cardiovascular:  Regular rate and rhythm. No murmur. Adequate perfusion/capillary refill. Femoral pulse present                  Abdomen:    Soft, non-tender, no masses, bowel sounds present, no HSM            Genitourinary:  Normal female genitalia, anus patent                         Spine:   No abnormalities noted       Musculoskeletal:   Full range of motion         Skin/Hair/Nails:   Skin warm, dry, and intact, no rashes or abnormal dyspigmentation or lesions               Neurologic:   No abnormal movement, tone appropriate for gestational age     Latch:  Efficiency:               Lips Flanged: Yes, lower lip, upper lip requires adjustments               Depth of latch: wide               Audible Swallow: Yes, intermittently               Visible Milk: Yes              Wide Open/ Asymmetrical: Yes              Suck Swallow Cycle: Breathing: yes, Coordinated: yes  Nipple Assessment after  latch: Normal: elongated/eraser, no discoloration and no damage noted.  Latch Problems: She does appear to have a wide attachment, upper lip curls under throughout the feeding session but adjusts easily. She takes good bursts of sucks and swallows but does appear to fatigue quickly. Receptive to breast compressions.     Baby nurses on 3 breasts until content, transfers 60 mL in that time.     Position:  Infant's Ergonomics/Body               Body Alignment: Yes               Head Supported: Yes               Close to Mom's body/ Lifted/ Supported: Yes               Mom's Ergonomics/Body: Yes                           Supported: Yes                           Sitting Back: Yes                           Brings Baby to her breast: Yes  Positioning Problems: Reviewed BN/prone hold and Mom returns this demonstration very well on 3 breasts.       Education:  Reviewed Latch: importance of deep latch without pain.   Reviewed Positioning for Dyad: proper alignment and head angle when positioning at the breast   Reviewed Frequency/Supply & Demand: offer the breast at each feeding, pump if baby is not latching and effective transferring milk.   Reviewed Infant:Cues and varied States of Awareness: watch for hunger cues, feed on demand. If baby seems satisfied at the breast (calm, relaxed sleeping, breasts are softer) no need to pump or supplement   Reviewed Infant Elimination: goal of 6+ wets and 2-3 stools per day   Reviewed Alternative/Artificial Feedings: paced bottle feeding technique demonstrated  Reviewed Mom/Breast care: gentle handling of the breast at all times, as well as tips for healing sore nipples.    Reviewed Equipment: Hand pump and electric pump general guidance, Discussed proper flange fit, how to measure        Plan:      Reassurance provided that baby is growing well at this time. Cont with positioning adjustments and watch for signs of effective feeding if offering the breast. Pump in place of nursing or  after sleep/ineffective nursing sessions as needed. Gentle handling of the breast at all times to preserve integrity. Utilize cycle pumping and lowest effective suction when pumping. Contact [facility] for breastfeeding support as needed or ongoing concerns with latching comfort and milk transfer.  Follow up with lactation in two weeks for ongoing support. Speech therapy consult placed. Appt set with breastfeeding medicine.     I have spent 90 minutes with Patient and family today in which greater than 50% of this time was spent in counseling/coordination of care regarding Patient and family education.              [1]   Past Medical History:  Diagnosis Date    Abnormal Pap smear of cervix     Asthma     exercise induced    ANDREA III (cervical intraepithelial neoplasia grade III) with severe dysplasia 06/26/2019    Exercise-induced asthma     HPV (human papilloma virus) infection     Pregnancy 11/12/2024    RA (rheumatoid arthritis) ([facility])     Seasonal allergies     Varicella     Vaccine   [2]   Current Outpatient Medications:     acetaminophen (TYLENOL) 160 mg/5 mL suspension, Take 20.3 mL (650 mg total) by mouth every 6 (six) hours, Disp: , Rfl:     albuterol (PROVENTIL HFA,VENTOLIN HFA) 90 mcg/act inhaler, Inhale 2 puffs every 4 (four) hours as needed for wheezing (Patient not taking: Reported on 5/30/2025), Disp: , Rfl:     benzocaine-menthol-lanolin-aloe (DERMOPLAST) 20-0.5 % topical spray, Apply 1 Application topically every 6 (six) hours as needed for mild pain or irritation (Patient not taking: Reported on 5/30/2025), Disp: , Rfl:     Butalbital-APAP-Caffeine (Fioricet) -40 MG CAPS, Take 1 tablet by mouth every 6 (six) hours as needed (headache), Disp: 30 capsule, Rfl: 0    CALCIUM PO, Take 2,000 mg by mouth in the morning., Disp: , Rfl:     Cholecalciferol (Vitamin D) 125 MCG (5000 UT) CAPS, Take by mouth in the morning., Disp: , Rfl:     hydrocortisone 1 % cream, Apply 1 Application topically  daily as needed for irritation or rash (Patient not taking: Reported on 5/30/2025), Disp: , Rfl:     ibuprofen (MOTRIN) 100 mg/5 mL suspension, Take 30 mL (600 mg total) by mouth every 6 (six) hours (Patient not taking: Reported on 5/30/2025), Disp: , Rfl:     ketorolac (TORADOL) 10 mg tablet, Take 1 tablet (10 mg total) by mouth every 6 (six) hours as needed for moderate pain, Disp: 12 tablet, Rfl: 0    lidocaine (LIDODERM) 5 %, Apply 1 patch topically daily over 12 hours Remove & Discard patch within 12 hours or as directed by MD (Patient not taking: Reported on 5/30/2025), Disp: 10 patch, Rfl: 0    polyethylene glycol (MIRALAX) 17 g packet, Take 17 g by mouth daily, Disp: , Rfl:     Prenatal Vit-Fe Fumarate-FA (PRENATAL VITAMIN PO), Take 1 tablet by mouth in the morning (Patient not taking: Reported on 5/30/2025), Disp: , Rfl:     simethicone (MYLICON) 80 mg chewable tablet, Chew 1 tablet (80 mg total) 4 (four) times a day as needed for flatulence (Patient not taking: Reported on 5/30/2025), Disp: , Rfl:     witch hazel-glycerin (TUCKS) topical pad, Apply 1 Pad topically every 4 (four) hours as needed for irritation, Disp: , Rfl:     Current Facility-Administered Medications:     ondansetron (ZOFRAN-ODT) dispersible tablet 4 mg, 4 mg, Oral, Q6H PRN, CHARY Abreu, 4 mg at 01/06/23 1255

## 2025-06-10 NOTE — PATIENT INSTRUCTIONS
"-Great meeting with you and your sweet baby girl today!   -You may offer her the breast when she seems hungry, she fed very well today! Watch for signs throughout the feeding that baby is effectively removing milk. You will feel strong tugging, hear swallowing and will feel your breasts get softer after nursing.   -No need to pump if baby is latching and feeding well at the breast on demand.   -Pump only as needed for missed feedings at the breast or for uncomfortable engorgement, utilize flange fit and settings that are comfortable for you, pumping should not be painful!   --Cycle pumping : high speed (70), low suction (2-3) until you see your milk flow, then switch to (expression mode) a lower speed and higher suction to express the milk efficiently. Continue to gradually increase suction and decrease speed throughout the session. You may cycle back into \"massage mode\" to stimulate another letdown when you see your milk flow slow within the pumping session or at the end of the pumping session.   -Wear a supportive, but not tight, bra. Sit comfortably reclined when nursing or pumping, and throughout the day when possible.    - Storing and Thawing Breast Milk  [facility] Breastfeeding Support ([employer])    -Follow up with Ped for weight check as scheduled and lactation in two weeks as scheduled.  -Appt set with breastfeeding medicine for 3 weeks.   "

## 2025-06-15 NOTE — PROGRESS NOTES
I have reviewed the notes, assessments, and/or procedures performed by Grecia Jose RN, IBCLC, I concur with her/his documentation of Lori Mack MD 06/15/25

## 2025-06-30 ENCOUNTER — POSTPARTUM VISIT (OUTPATIENT)
Dept: OBGYN CLINIC | Facility: CLINIC | Age: 35
End: 2025-06-30

## 2025-06-30 VITALS
WEIGHT: 138 LBS | HEIGHT: 67 IN | DIASTOLIC BLOOD PRESSURE: 60 MMHG | SYSTOLIC BLOOD PRESSURE: 98 MMHG | BODY MASS INDEX: 21.66 KG/M2

## 2025-06-30 PROBLEM — E05.90 HYPERTHYROIDISM AFFECTING PREGNANCY: Status: RESOLVED | Noted: 2024-11-25 | Resolved: 2025-06-30

## 2025-06-30 PROBLEM — O09.523 AMA (ADVANCED MATERNAL AGE) MULTIGRAVIDA 35+, THIRD TRIMESTER: Status: RESOLVED | Noted: 2025-01-14 | Resolved: 2025-06-30

## 2025-06-30 PROBLEM — O99.280 HYPERTHYROIDISM AFFECTING PREGNANCY: Status: RESOLVED | Noted: 2024-11-25 | Resolved: 2025-06-30

## 2025-06-30 PROCEDURE — PNV: Performed by: OBSTETRICS & GYNECOLOGY

## 2025-06-30 NOTE — PROGRESS NOTES
Diagnoses and all orders for this visit:    Encounter for postpartum visit        Normal postpartum exam.     1. Contraception: coitus interruptus and condoms.  2. Annual exam due in September.   3. Increase activity as tolerated, may resume all normal activity.  4. Lactation consult needed: no; if yes, [facility] information discussed.         Subjective   Chief Complaint   Patient presents with    Postpartum Care       Lori Camacho is a 35 y.o.  female who presents for a postpartum visit.     Delivery Method: Vaginal, Spontaneous   Delivery Date and Time: 2025 1:59 PM  Delivery Attending: Brisa Medeiros  Gestational Age at delivery: 39w0d   Route of Delivery: Vaginal, Spontaneous      Admitting Diagnoses:   Problem List[1]    Gestational Diabetes: no  Pregnancy Complications: spinal headache     Anesthesia: Epidural,     Delivery: Vaginal, Spontaneous at 2025 1:59 PM  Laceration: Perineal: None Repaired?      Baby's Name: Kenyetta Camacho   Baby's Weight: 3525 g (7 lb 12.3 oz); 124.34    Apgar scores: 9  and 9  at 1 and 5 minutes, respectively  Breast or formula: Breastfeeding & pumping & formula   Pediatrician: [facility]     Bleeding none. Bowel function: normal. Bladder function: normal. The patient is not having any pain.     She had spinal headache, received blood patch x 2, symptoms improved for 24 hrs then returned. Her symptoms then started to improve 1.5 weeks after delivery and resolved by 2.5 weeks post delivery.    Patient has not been sexually active. Desired contraception method is coitus interruptus and rhythm method. Discussed IUD as an option. Needs to avoid estrogen d/t rheumatoid arthritis medications. Discussed progesterone only options.     Postpartum depression screening: negative. EPDS : 4. She denies depression/anxiety/trouble sleeping. Some anxiety but getting better. Able to function, take care of self and baby.     Last  "Pap : 08/27/2024 ; atypical squamous cellularity of undetermined significance (ASCUS)/ Negative HPV       The following portions of the patient's history were reviewed and updated as appropriate: allergies, current medications, past family history, past medical history, past social history, past surgical history, and problem list.    Current Medications[2]    Allergies[3]    Review of Systems  Review of Systems   Constitutional:  Negative for chills and fever.   Respiratory:  Negative for shortness of breath.    Cardiovascular:  Negative for chest pain and leg swelling.   Gastrointestinal:  Negative for abdominal distention, abdominal pain, constipation, nausea and vomiting.   Genitourinary:  Negative for dysuria, frequency, urgency, vaginal bleeding and vaginal discharge.   Neurological:  Negative for headaches.         Objective      BP 98/60 (BP Location: Left arm, Patient Position: Sitting, Cuff Size: Standard)   Ht 5' 7\" (1.702 m)   Wt 62.6 kg (138 lb)   LMP 08/30/2024 (Exact Date)   Breastfeeding Yes Comment: & pumping & formula  BMI 21.61 kg/m²     Physical Exam  Constitutional:       General: She is not in acute distress.     Appearance: Normal appearance. She is well-developed. She is not ill-appearing.   Pulmonary:      Effort: Pulmonary effort is normal. No respiratory distress.     Neurological:      General: No focal deficit present.      Mental Status: She is alert and oriented to person, place, and time.     Psychiatric:         Mood and Affect: Mood normal.         Behavior: Behavior normal.         Thought Content: Thought content normal.         Judgment: Judgment normal.   Vitals and nursing note reviewed.                   [1]   Patient Active Problem List  Diagnosis    Seropositive rheumatoid arthritis of multiple sites (HCC)    Vitamin D insufficiency    Exercise-induced asthma    Autoimmune thyroiditis    Preauricular adenopathy    Seasonal allergies    Low TSH level    Neck pain    " Spinal headache   [2]   Current Outpatient Medications:     acetaminophen (TYLENOL) 160 mg/5 mL suspension, Take 20.3 mL (650 mg total) by mouth every 6 (six) hours, Disp: , Rfl:     CALCIUM PO, Take 2,000 mg by mouth in the morning., Disp: , Rfl:     Cholecalciferol (Vitamin D) 125 MCG (5000 UT) CAPS, Take by mouth in the morning., Disp: , Rfl:   No current facility-administered medications for this visit.  [3]   Allergies  Allergen Reactions    Amoxicillin Other (See Comments)     Childhood allergy     Other Other (See Comments)     Seasonal- Runny nose/ congestion

## 2025-07-02 ENCOUNTER — TELEPHONE (OUTPATIENT)
Dept: NEUROLOGY | Facility: CLINIC | Age: 35
End: 2025-07-02

## 2025-07-02 NOTE — PROGRESS NOTES
Name: Lori Camacho      : 1990      MRN: [id number]  Encounter Provider: CHARY Johnston  Encounter Date: 7/3/2025   Encounter department: [facility]  :    Assessment & Plan  Spinal headache  While the patient was in labor, she received an epidural for pain relief .  Soon after the delivery of her baby she noticed the symptoms began.  She was experiencing headaches that were positional in nature that were relieved by lying flat and exacerbated when she was sitting up or moving around.  She did receive a blood patch on  and had her symptoms return within the next day.  So she did receive a second blood patch on  and she was discharged home and was told to rest, drink caffeine, and avoid straining.  The symptoms resolved on their own in about 2.5 weeks.  She has been headache free now for over a month.  She denies any back pain, headaches, dizziness, nausea, or vomiting.  She reports that both she and the baby are doing very well.  She has questions in regards to recurrence of symptoms as she is planning to have 1 more pregnancy in the near future.  We did discuss the nature of spinal headaches and dural tears.  Since she is now a month out from having any symptoms her care likely has completely healed at this time.  Low likelihood for recurrence at this time and okay for her to resume normal activity if she hasn't already.  Low likelihood for recurrence with additional epidurals although not impossible.  Same risk as with the first one but I told her she should inform her care team at the hospital that this had happened with a previous epidural so they are aware.  She can reach out to the office anytime with any further questions or recurrence of symptoms.    Orders:    Ambulatory Referral to Neurology          History of Present Illness     We had the pleasure of evaluating Lori in neurological consultation today. She is a 35 y.o. year-old female  who presents today for evaluation of spinal headaches.     While the patient was in labor, she received an epidural for pain relief 5/23.  Soon after the delivery of her baby she noticed the symptoms began.  She was experiencing headaches that were positional in nature that were relieved by lying flat and exacerbated when she was sitting up or moving around.  She did receive a blood patch on 5/24 and had her symptoms return within the next day.  So she did receive a second blood patch on 5/25 and she was discharged home and was told to rest, drink caffeine, and avoid straining.  The symptoms resolved on their own in about 2.5 weeks.  She has been headache free now for over a month.  She denies any back pain, headaches, dizziness, nausea, or vomiting.  She reports that both she and the baby are doing very well.  She has questions in regards to recurrence of symptoms as she is planning to have 1 more pregnancy in the near future.    Associated symptoms:   [x] Nausea       [] Vomiting        [] Diarrhea  [] Insomnia    [] Stiff or sore neck   [] Problems with concentration  [] Photophobia     []Phonophobia      [] Osmophobia  [] Blurred vision   [] Prefer quiet, dark room  [] Light-headed or dizzy     [] Tinnitus   [] Hands or feet tingle or feel numb/paresthesias    [] Ptosis      [] Facial droop  [] Lacrimation  [] Nasal congestion/rhinorrhea   [] Flushing of face    Things that make the headache worse? Sitting up or standing up made them worse    Headache triggers:  epidural spinal block      Have you had epidural injections or transforaminal injections performed? Yes, for pregnancy 5/23/25  Are you current pregnant or planning on getting pregnant? No, breastfeeding? Barrier method  Have you ever had any Brain imaging? Yes-[facility]     What medications do you take or have you taken for your headaches?:    ABORTIVE:    OTC medications: tylenol  Prescription: none  Medications from other providers:  "fioricet  Past/failed/contraindicated: none    PREVENTIVE:   OTC medications: none  Prescription: none  Medications from other providers: none  Past/failed/contraindicated: none    Alternative therapies used in the past for headaches?   [] Massage   [] physical therapy   [] chiropractor [] acupuncture [] acupressure   [] CBT  [] Biofeedback  [x] Blood patch x2     Review of Systems   Review of Systems   Constitutional:  Negative for appetite change, fatigue and fever.   HENT: Negative.  Negative for hearing loss, tinnitus, trouble swallowing and voice change.    Eyes: Negative.  Negative for photophobia, pain and visual disturbance.   Respiratory: Negative.  Negative for shortness of breath.    Cardiovascular: Negative.  Negative for palpitations.   Gastrointestinal: Negative.  Negative for nausea and vomiting.   Endocrine: Negative.  Negative for cold intolerance.   Genitourinary: Negative.  Negative for dysuria, frequency and urgency.   Musculoskeletal:  Negative for back pain, gait problem, myalgias, neck pain and neck stiffness.   Skin: Negative.  Negative for rash.   Allergic/Immunologic: Negative.    Neurological:  Positive for headaches. Negative for dizziness, tremors, seizures, syncope, facial asymmetry, speech difficulty, weakness, light-headedness and numbness.        Patient states she did have a spinal HA about 2- 21/2 weeks following the birth of her daughter. Patient states it has since resolved but did keep appointment to ask a few questions.    Hematological: Negative.  Does not bruise/bleed easily.   Psychiatric/Behavioral: Negative.  Negative for confusion, hallucinations and sleep disturbance.      I have personally reviewed the MA's review of systems and made changes as necessary.    Medications Ordered Prior to Encounter[1]   Social History[2]     Objective   /92 (BP Location: Left arm, Patient Position: Sitting, Cuff Size: Adult)   Ht 5' 7\" (1.702 m)   Wt 62.1 kg (137 lb)   LMP " 08/30/2024 (Exact Date)   BMI 21.46 kg/m²     Physical Exam  Vitals reviewed.   Constitutional:       General: She is not in acute distress.  HENT:      Head: Normocephalic and atraumatic.   Pulmonary:      Effort: No respiratory distress.     Psychiatric:         Mood and Affect: Affect normal.       On neurological examination the patient was awake, alert, attentive, oriented to person, place, and time. Recent and remote memory intact to conversation with no evidence of language dysfunction. Satisfactory fund of knowledge. Normal attention span and concentration.  Mood, affect and judgement are appropriate. Speech is fluent without dysarthria or aphasia. Face appears symmetric, with no obvious weakness noted. Audition is intact to casual conversation.  Eye movements are intact.  Able to move bilateral upper extremities antigravity without difficulty.       Labs:  Lab Results   Component Value Date    PFX5XDLFGQZZ 1.187 05/09/2025     Lab Results   Component Value Date    HGBA1C 5.4 05/09/2025     Lab Results   Component Value Date    WBC 12.97 (H) 05/23/2025    HGB 11.3 (L) 05/23/2025    HCT 34.3 (L) 05/23/2025    MCV 95 05/23/2025     05/23/2025     Lab Results   Component Value Date     10/09/2014    SODIUM 135 03/04/2025    K 3.4 (L) 03/04/2025     03/04/2025    CO2 26 03/04/2025    ANIONGAP 8 10/09/2014    AGAP 5 03/04/2025    BUN 7 03/04/2025    CREATININE 0.46 (L) 03/04/2025    GLUC 93 03/04/2025    GLUF 92 03/15/2024    CALCIUM 8.7 03/04/2025    AST 10 (L) 03/04/2025    ALT 6 (L) 03/04/2025    ALKPHOS 66 03/04/2025    PROT 8.1 10/09/2014    TP 6.5 03/04/2025    BILITOT 0.9 10/09/2014    TBILI 0.35 03/04/2025    EGFR 130 03/04/2025     Radiology Results Review:   5/25/25 CTH: No acute intracranial abnormality.     Administrative Statements   I have spent a total time of 35 minutes in caring for this patient on the day of the visit/encounter including Diagnostic results, Prognosis, Risks  and benefits of tx options, Instructions for management, Patient and family education, Importance of tx compliance, Risk factor reductions, Impressions, Counseling / Coordination of care, Documenting in the medical record, Reviewing/placing orders in the medical record (including tests, medications, and/or procedures), and Obtaining or reviewing history  .         [1]   Current Outpatient Medications on File Prior to Visit   Medication Sig Dispense Refill    acetaminophen (TYLENOL) 160 mg/5 mL suspension Take 20.3 mL (650 mg total) by mouth every 6 (six) hours (Patient not taking: Reported on 7/3/2025)      CALCIUM PO Take 2,000 mg by mouth in the morning. (Patient not taking: Reported on 7/3/2025)      Cholecalciferol (Vitamin D) 125 MCG (5000 UT) CAPS Take by mouth in the morning. (Patient not taking: Reported on 7/3/2025)       No current facility-administered medications on file prior to visit.   [2]   Social History  Tobacco Use    Smoking status: Never    Smokeless tobacco: Never   Vaping Use    Vaping status: Never Used   Substance and Sexual Activity    Alcohol use: Not Currently     Alcohol/week: 1.0 standard drink of alcohol     Types: 1 Glasses of wine per week     Comment: occasional    Drug use: No    Sexual activity: Not Currently     Partners: Male     Birth control/protection: Injection

## 2025-07-03 ENCOUNTER — OFFICE VISIT (OUTPATIENT)
Dept: NEUROLOGY | Facility: CLINIC | Age: 35
End: 2025-07-03
Payer: COMMERCIAL

## 2025-07-03 VITALS
SYSTOLIC BLOOD PRESSURE: 118 MMHG | HEIGHT: 67 IN | DIASTOLIC BLOOD PRESSURE: 92 MMHG | WEIGHT: 137 LBS | BODY MASS INDEX: 21.5 KG/M2

## 2025-07-03 DIAGNOSIS — G97.1 SPINAL HEADACHE: ICD-10-CM

## 2025-07-03 PROCEDURE — 99203 OFFICE O/P NEW LOW 30 MIN: CPT

## 2025-07-03 NOTE — PROGRESS NOTES
Name: Lori Camacho      : 1990      MRN: [id number]  Encounter Provider: CHARY Johnston  Encounter Date: 7/3/2025   Encounter department: [facility]  :  Assessment & Plan      {Ambulatory Patient Instructions (Optional):08364}    History of Present Illness {?Quick Links Encounters * My Last Note * Last Note in Specialty * Snapshot * Since Last Visit * History :90939}  Headache     Review of Systems   Constitutional:  Negative for appetite change, fatigue and fever.   HENT: Negative.  Negative for hearing loss, tinnitus, trouble swallowing and voice change.    Eyes: Negative.  Negative for photophobia, pain and visual disturbance.   Respiratory: Negative.  Negative for shortness of breath.    Cardiovascular: Negative.  Negative for palpitations.   Gastrointestinal: Negative.  Negative for nausea and vomiting.   Endocrine: Negative.  Negative for cold intolerance.   Genitourinary: Negative.  Negative for dysuria, frequency and urgency.   Musculoskeletal:  Negative for back pain, gait problem, myalgias, neck pain and neck stiffness.   Skin: Negative.  Negative for rash.   Allergic/Immunologic: Negative.    Neurological:  Positive for headaches. Negative for dizziness, tremors, seizures, syncope, facial asymmetry, speech difficulty, weakness, light-headedness and numbness.        Patient states she did have a spinal HA about 2- 21/2 weeks following the birth of her daughter. Patient states it has since resolved but did keep appointment to ask a few questions.    Hematological: Negative.  Does not bruise/bleed easily.   Psychiatric/Behavioral: Negative.  Negative for confusion, hallucinations and sleep disturbance.     I have personally reviewed the MA's review of systems and made changes as necessary.    {Select to insert medical history sections (Optional):70869}     Objective {?Quick Links Trend Vitals * Enter New Vitals * Results Review * Timeline (Adult) *  Labs * Imaging * Cardiology * Procedures * Lung Cancer Screening * Surgical eConsent :37097}  LMP 08/30/2024 (Exact Date)     Physical Exam  Neurological Exam    {Radiology Results Review (Optional):32483}    {Administrative / Billing Section (Optional):43015}

## 2025-07-03 NOTE — ASSESSMENT & PLAN NOTE
While the patient was in labor, she received an epidural for pain relief 5/23.  Soon after the delivery of her baby she noticed the symptoms began.  She was experiencing headaches that were positional in nature that were relieved by lying flat and exacerbated when she was sitting up or moving around.  She did receive a blood patch on 5/24 and had her symptoms return within the next day.  So she did receive a second blood patch on 5/25 and she was discharged home and was told to rest, drink caffeine, and avoid straining.  The symptoms resolved on their own in about 2.5 weeks.  She has been headache free now for over a month.  She denies any back pain, headaches, dizziness, nausea, or vomiting.  She reports that both she and the baby are doing very well.  She has questions in regards to recurrence of symptoms as she is planning to have 1 more pregnancy in the near future.  We did discuss the nature of spinal headaches and dural tears.  Since she is now a month out from having any symptoms her care likely has completely healed at this time.  Low likelihood for recurrence at this time and okay for her to resume normal activity if she hasn't already.  Low likelihood for recurrence with additional epidurals although not impossible.  Same risk as with the first one but I told her she should inform her care team at the hospital that this had happened with a previous epidural so they are aware.  She can reach out to the office anytime with any further questions or recurrence of symptoms.    Orders:    Ambulatory Referral to Neurology

## 2025-08-08 ENCOUNTER — APPOINTMENT (OUTPATIENT)
Dept: LAB | Facility: HOSPITAL | Age: 35
End: 2025-08-08
Payer: COMMERCIAL

## 2025-08-14 ENCOUNTER — OFFICE VISIT (OUTPATIENT)
Dept: ENDOCRINOLOGY | Facility: CLINIC | Age: 35
End: 2025-08-14
Payer: COMMERCIAL

## 2025-08-14 VITALS
OXYGEN SATURATION: 98 % | HEIGHT: 67 IN | SYSTOLIC BLOOD PRESSURE: 108 MMHG | HEART RATE: 96 BPM | DIASTOLIC BLOOD PRESSURE: 72 MMHG | WEIGHT: 135 LBS | BODY MASS INDEX: 21.19 KG/M2

## 2025-08-14 DIAGNOSIS — D50.9: ICD-10-CM

## 2025-08-14 DIAGNOSIS — E55.9 VITAMIN D DEFICIENCY: ICD-10-CM

## 2025-08-14 DIAGNOSIS — R79.89 ABNORMAL TSH: Primary | ICD-10-CM

## 2025-08-14 PROCEDURE — 99214 OFFICE O/P EST MOD 30 MIN: CPT | Performed by: INTERNAL MEDICINE

## 2025-08-19 ENCOUNTER — TELEPHONE (OUTPATIENT)
Age: 35
End: 2025-08-19